# Patient Record
Sex: FEMALE | Race: WHITE | NOT HISPANIC OR LATINO | Employment: UNEMPLOYED | URBAN - METROPOLITAN AREA
[De-identification: names, ages, dates, MRNs, and addresses within clinical notes are randomized per-mention and may not be internally consistent; named-entity substitution may affect disease eponyms.]

---

## 2024-06-25 ENCOUNTER — OFFICE VISIT (OUTPATIENT)
Dept: PRIMARY CARE CLINIC | Facility: CLINIC | Age: 29
End: 2024-06-25
Payer: COMMERCIAL

## 2024-06-25 ENCOUNTER — LAB VISIT (OUTPATIENT)
Dept: LAB | Facility: HOSPITAL | Age: 29
End: 2024-06-25
Attending: FAMILY MEDICINE
Payer: COMMERCIAL

## 2024-06-25 VITALS
SYSTOLIC BLOOD PRESSURE: 122 MMHG | BODY MASS INDEX: 42.53 KG/M2 | DIASTOLIC BLOOD PRESSURE: 76 MMHG | OXYGEN SATURATION: 97 % | TEMPERATURE: 98 F | HEART RATE: 70 BPM | WEIGHT: 231.13 LBS | HEIGHT: 62 IN

## 2024-06-25 DIAGNOSIS — F39 MOOD DISORDER: ICD-10-CM

## 2024-06-25 DIAGNOSIS — R63.5 ABNORMAL WEIGHT GAIN: ICD-10-CM

## 2024-06-25 DIAGNOSIS — R42 POSITIONAL LIGHTHEADEDNESS: ICD-10-CM

## 2024-06-25 DIAGNOSIS — Z00.00 ROUTINE GENERAL MEDICAL EXAMINATION AT A HEALTH CARE FACILITY: ICD-10-CM

## 2024-06-25 DIAGNOSIS — R63.5 ABNORMAL WEIGHT GAIN: Primary | ICD-10-CM

## 2024-06-25 DIAGNOSIS — N94.6 DYSMENORRHEA: ICD-10-CM

## 2024-06-25 DIAGNOSIS — Z12.4 ENCOUNTER FOR SCREENING FOR CERVICAL CANCER: ICD-10-CM

## 2024-06-25 DIAGNOSIS — R41.840 DIFFICULTY CONCENTRATING: ICD-10-CM

## 2024-06-25 DIAGNOSIS — N92.6 ABNORMAL MENSES: ICD-10-CM

## 2024-06-25 LAB
INSULIN COLLECTION INTERVAL: NORMAL
INSULIN SERPL-ACNC: 20.3 UU/ML

## 2024-06-25 PROCEDURE — 3078F DIAST BP <80 MM HG: CPT | Mod: CPTII,S$GLB,, | Performed by: FAMILY MEDICINE

## 2024-06-25 PROCEDURE — 82728 ASSAY OF FERRITIN: CPT | Performed by: FAMILY MEDICINE

## 2024-06-25 PROCEDURE — 80061 LIPID PANEL: CPT | Performed by: FAMILY MEDICINE

## 2024-06-25 PROCEDURE — G2211 COMPLEX E/M VISIT ADD ON: HCPCS | Mod: S$GLB,,, | Performed by: FAMILY MEDICINE

## 2024-06-25 PROCEDURE — 84439 ASSAY OF FREE THYROXINE: CPT | Performed by: FAMILY MEDICINE

## 2024-06-25 PROCEDURE — 80053 COMPREHEN METABOLIC PANEL: CPT | Performed by: FAMILY MEDICINE

## 2024-06-25 PROCEDURE — 1160F RVW MEDS BY RX/DR IN RCRD: CPT | Mod: CPTII,S$GLB,, | Performed by: FAMILY MEDICINE

## 2024-06-25 PROCEDURE — 84443 ASSAY THYROID STIM HORMONE: CPT | Performed by: FAMILY MEDICINE

## 2024-06-25 PROCEDURE — 3008F BODY MASS INDEX DOCD: CPT | Mod: CPTII,S$GLB,, | Performed by: FAMILY MEDICINE

## 2024-06-25 PROCEDURE — 36415 COLL VENOUS BLD VENIPUNCTURE: CPT | Mod: PN | Performed by: FAMILY MEDICINE

## 2024-06-25 PROCEDURE — 83540 ASSAY OF IRON: CPT | Performed by: FAMILY MEDICINE

## 2024-06-25 PROCEDURE — 83036 HEMOGLOBIN GLYCOSYLATED A1C: CPT | Performed by: FAMILY MEDICINE

## 2024-06-25 PROCEDURE — 99205 OFFICE O/P NEW HI 60 MIN: CPT | Mod: S$GLB,,, | Performed by: FAMILY MEDICINE

## 2024-06-25 PROCEDURE — 3074F SYST BP LT 130 MM HG: CPT | Mod: CPTII,S$GLB,, | Performed by: FAMILY MEDICINE

## 2024-06-25 PROCEDURE — 85025 COMPLETE CBC W/AUTO DIFF WBC: CPT | Performed by: FAMILY MEDICINE

## 2024-06-25 PROCEDURE — 1159F MED LIST DOCD IN RCRD: CPT | Mod: CPTII,S$GLB,, | Performed by: FAMILY MEDICINE

## 2024-06-25 PROCEDURE — 83525 ASSAY OF INSULIN: CPT | Performed by: FAMILY MEDICINE

## 2024-06-25 PROCEDURE — 99999 PR PBB SHADOW E&M-NEW PATIENT-LVL V: CPT | Mod: PBBFAC,,, | Performed by: FAMILY MEDICINE

## 2024-06-25 RX ORDER — MULTIVITAMIN
1 TABLET ORAL DAILY
COMMUNITY

## 2024-06-25 RX ORDER — LANOLIN ALCOHOL/MO/W.PET/CERES
1 CREAM (GRAM) TOPICAL
COMMUNITY

## 2024-06-25 RX ORDER — MAGNESIUM GLUCONATE 27.5 (500)
TABLET ORAL ONCE
COMMUNITY

## 2024-06-25 NOTE — PROGRESS NOTES
Chief Complaint  Chief Complaint   Patient presents with    [facility]       HPI  Tamiko An is a 28 y.o. female with multiple medical diagnoses as listed in the medical history and problem list that presents for  in person visit.    The patient's last visit with me was on Visit date not found.     History of Present Illness    28-year-old female presents to [facility].    Appendectomy in 2016. History of anxiety, depression, and severe ADHD. Denies any other past medical conditions or surgical procedures.    Diabetes mellitus in mother, aunt, and father. Maternal grandfather had heart disease. Early onset arthritis in maternal grandmother, maternal grandfather, and paternal relatives. Father has history of alcohol and drug abuse. Mother has hypertension.    Reports heavy and painful menstrual cycles, having to change an ultra plus tampon every 45 minutes to an hour during the first 3 days. Associated nausea, dizziness, shortness of breath, and exhaustion to the point of being bedridden for a day. Irregular cycles ranging from monthly to 6 months without a period. Denies current contraception use as it previously worsened cycles and caused nausea.    Presenting with weight gain, inability to lose weight despite diet and exercise, heavy and painful menses, irregular cycles, unusual hair growth on face, and POTS-like symptoms. Family history of endometriosis. Previously on Depo-Provera at age 17 which led to weight gain. Concerned about fertility and wants evaluation for possible PCOS.    Reports difficulty losing weight despite being on a calorie deficit diet for 6 months and exercising 30 minutes twice daily. Has cut out refined sugars, limited sugar-free options, drinks mainly water, and limits carbs. Only lost around 5 lbs despite consistency. Rapid 90 pound weight gain over past 5 years. Denies recent bloodwork.    Takes a multivitamin, iron, magnesium, turmeric, and calcium daily as recommended by  previous doctor for obesity and metabolic issues. Started taking supplements on mother's recommendation. Denies prescription medications.    Reports lightheadedness and rapid heart rate upon standing, having to sit after climbing stairs. Mother and sister diagnosed with POTS. Unsure if symptoms are weight-related or due to potential anemia.    Reports dry and itchy ears with minimal wax production. On exam, provider notes minimal wax buildup.       [facility]    6/22/2024  8:21 AM CDT - Filed by Patient   This questionnaire should take approximately 5 to 10 minutes to complete.  To begin, press Let's Begin and then press Continue. Let's Begin   On average, how many days per week do you engage in moderate to strenuous exercise (like a brisk walk)? 5 days   On average, how many minutes do you engage in exercise at this level? 30 min   Do you feel stress - tense, restless, nervous, or anxious, or unable to sleep at night because your mind is troubled all the time - these days? To some extent   How often do you feel lonely or isolated from those around you? Sometimes   How often do you need to have someone help you when you read instructions, pamphlets, or other written material from your doctor or pharmacy? Rarely   How hard is it for you to pay for the very basics like food, housing, medical care, and heating? Not hard at all   In the past 12 months has the electric, gas, oil, or water company threatened to shut off services in your home? No   Within the past 12 months, you worried that your food would run out before you got the money to buy more. Never true   Within the past 12 months, the food you bought just didn't last and you didn't have money to get more. Never true   Has the lack of transportation kept you from medical appointments, meetings, work or from getting things needed for daily living? No   In the last 12 months, was there a time when you were not able to pay the mortgage or rent on time? No   In the  last 12 months, was there a time when you did not have a steady place to sleep or slept in a shelter (including now)? No   How often do you have a drink containing alcohol? Monthly or less   How many drinks containing alcohol do you have on a typical day when you are drinking? 3 or 4   How often do you have six or more drinks on one occasion? Less than monthly          Pmh, Psh, Family Hx, Social Hx updated in Epic Tabs today.     Review of Systems   Constitutional:  Positive for activity change, appetite change and unexpected weight change. Negative for chills, fatigue and fever.   HENT:  Negative for ear pain and trouble swallowing.    Eyes:  Negative for pain and visual disturbance.   Respiratory:  Negative for cough and shortness of breath.    Cardiovascular:  Negative for chest pain and leg swelling.   Gastrointestinal:  Negative for abdominal pain, blood in stool, nausea and vomiting.   Endocrine: Negative for cold intolerance and heat intolerance.   Genitourinary:  Positive for menstrual problem. Negative for dysuria, frequency and pelvic pain.   Musculoskeletal:  Positive for arthralgias and back pain. Negative for joint swelling, myalgias and neck pain.   Skin:  Negative for color change and rash.   Neurological:  Positive for dizziness and light-headedness. Negative for headaches.   Psychiatric/Behavioral:  Positive for decreased concentration. Negative for behavioral problems and sleep disturbance. The patient is nervous/anxious.        Physical Exam  Vitals reviewed.   Constitutional:       Appearance: Normal appearance. She is well-developed and well-groomed. She is morbidly obese.   HENT:      Head: Normocephalic and atraumatic.      Right Ear: Tympanic membrane and external ear normal.      Left Ear: Tympanic membrane and external ear normal.      Ears:      Comments: Flakes of skin on external ears reported      Nose: Nose normal.      Mouth/Throat:      Mouth: Mucous membranes are moist.       "Pharynx: Oropharynx is clear.   Eyes:      Conjunctiva/sclera: Conjunctivae normal.      Pupils: Pupils are equal, round, and reactive to light.   Neck:      Thyroid: No thyromegaly.   Cardiovascular:      Rate and Rhythm: Normal rate and regular rhythm.      Heart sounds: Normal heart sounds. No murmur heard.     No friction rub. No gallop.   Pulmonary:      Effort: Pulmonary effort is normal. No respiratory distress.      Breath sounds: Normal breath sounds. No wheezing or rales.   Abdominal:      General: Bowel sounds are normal. There is no distension.      Palpations: Abdomen is soft.      Tenderness: There is no abdominal tenderness. There is no rebound.   Musculoskeletal:         General: Normal range of motion.      Cervical back: Normal range of motion and neck supple.   Lymphadenopathy:      Cervical: No cervical adenopathy.   Skin:     General: Skin is warm and dry.      Findings: No rash.   Neurological:      Mental Status: She is alert and oriented to person, place, and time.   Psychiatric:         Attention and Perception: Attention and perception normal.         Mood and Affect: Mood and affect normal.         Speech: Speech normal.         Behavior: Behavior normal. Behavior is cooperative.         Thought Content: Thought content normal.         Cognition and Memory: Cognition and memory normal.         Judgment: Judgment normal.       Physical Exam    Vitals: Height: 5 feet 2 inches. Weight: 104 kilograms. BMI: 42.         ASSESSMENT/PLAN:    No results found for: "WBC", "HGB", "HCT", "PLT", "CHOL", "TRIG", "HDL", "LDLDIRECT", "ALT", "AST", "NA", "K", "CL", "CREATININE", "BUN", "CO2", "TSH", "PSA", "INR", "GLUF", "HGBA1C", "MICROALBUR"    1. Abnormal weight gain  -     TSH; Future; Expected date: 06/25/2024  -     T4, Free; Future; Expected date: 06/25/2024  -     Lipid Panel; Future; Expected date: 06/25/2024  -     Hemoglobin A1C; Future; Expected date: 06/25/2024  -     Comprehensive Metabolic " Panel; Future; Expected date: 06/25/2024  -     CBC Auto Differential; Future; Expected date: 06/25/2024  -     Insulin, Random; Future; Expected date: 06/25/2024  -     Ambulatory referral/consult to Endocrinology; Future; Expected date: 07/02/2024  -     Iron and TIBC; Future; Expected date: 06/25/2024  -     Ferritin; Future; Expected date: 06/25/2024    2. Dysmenorrhea  -     TSH; Future; Expected date: 06/25/2024  -     T4, Free; Future; Expected date: 06/25/2024  -     Lipid Panel; Future; Expected date: 06/25/2024  -     Hemoglobin A1C; Future; Expected date: 06/25/2024  -     Comprehensive Metabolic Panel; Future; Expected date: 06/25/2024  -     CBC Auto Differential; Future; Expected date: 06/25/2024  -     Insulin, Random; Future; Expected date: 06/25/2024  -     Ambulatory referral/consult to Obstetrics / Gynecology; Future; Expected date: 07/02/2024  -     Ambulatory referral/consult to Endocrinology; Future; Expected date: 07/02/2024  -     Iron and TIBC; Future; Expected date: 06/25/2024  -     Ferritin; Future; Expected date: 06/25/2024    3. Abnormal menses    4. Mood disorder    5. Positional lightheadedness    6. Difficulty concentrating    7. Routine general medical examination at a health care facility  -     TSH; Future; Expected date: 06/25/2024  -     T4, Free; Future; Expected date: 06/25/2024  -     Lipid Panel; Future; Expected date: 06/25/2024  -     Hemoglobin A1C; Future; Expected date: 06/25/2024  -     Comprehensive Metabolic Panel; Future; Expected date: 06/25/2024  -     CBC Auto Differential; Future; Expected date: 06/25/2024  -     Insulin, Random; Future; Expected date: 06/25/2024  -     Iron and TIBC; Future; Expected date: 06/25/2024  -     Ferritin; Future; Expected date: 06/25/2024    8. Encounter for screening for cervical cancer  -     Ambulatory referral/consult to Obstetrics / Gynecology; Future; Expected date: 07/02/2024    9. BMI 40.0-44.9, adult        Assessment &  Plan    METABOLIC OR HORMONAL ISSUE:  - Suspect underlying metabolic or hormonal issue given weight gain despite lifestyle changes, family history of diabetes, and menstrual irregularities.  - Ordered labs to assess for diabetes, insulin resistance, and thyroid dysfunction.  - Discussed potential causes of weight gain including insulin resistance, PCOS, thyroid dysfunction.  - Ordered CBC, CMP, HbA1c, fasting insulin, TSH, free T4.  - Referred to Dr. Yesenia Jordan at [facility] for evaluation of weight gain and insulin resistance.  PCOS OR ENDOMETRIOSIS:  - Heavy, painful menses concerning for possible PCOS or endometriosis, especially with weight gain and hirsutism.  - Referred to GYN for evaluation.  - Referred to Dr. Robert Beltre at [facility] for evaluation of heavy, painful menses and possible PCOS/endometriosis.  ANEMIA OR POTS:  - Symptoms of lightheadedness and tachycardia with exertion could be anemia from heavy menses.  - Ordered CBC and iron studies.  - If normal, may refer to cardiology to evaluate for POTS given family history.  DRY, FLAKY EARS:  - Explained dry, flaky ears can often be managed with OTC hydrocortisone cream.  - Prescription steroid cream available if needed.  FOLLOW UP AND MENTAL HEALTH:  - Follow up in 3 months to review labs, GYN evaluation, and progress with [facility].  - Patient to message through online portal or call office if she obtains prior mental health records in the interim, as this will allow restarting psychiatric medications sooner.       No image results found.      Follow-up: Follow up in about 3 months (around 9/25/2024) for f/u OV Dr. Liz/ labs, mental health .    I spent a total of   60    minutes face to face and non-face to face on the date of this visit.This includes time preparing to see the patient (eg, review of tests, notes), obtaining and/or reviewing additional history from an independent historian and/or  outside medical records, documenting clinical information in the electronic health record, independently interpreting results and/or communicating results to the patient/family/caregiver, or care coordinator.  Visit today included increased complexity associated with the care of the episodic problem addressed and managing the longitudinal care of the patient due to the serious and/or complex managed problem(s).    This note was generated with the assistance of ambient listening technology. Verbal consent was obtained by the patient and accompanying visitor(s) for the recording of patient appointment to facilitate this note. I attest to having reviewed and edited the generated note for accuracy, though some syntax or spelling errors may persist. Please contact the author of this note for any clarification.       There are no Patient Instructions on file for this visit.

## 2024-06-26 LAB
ALBUMIN SERPL BCP-MCNC: 3.6 G/DL (ref 3.5–5.2)
ALP SERPL-CCNC: 91 U/L (ref 55–135)
ALT SERPL W/O P-5'-P-CCNC: 14 U/L (ref 10–44)
ANION GAP SERPL CALC-SCNC: 11 MMOL/L (ref 8–16)
AST SERPL-CCNC: 13 U/L (ref 10–40)
BASOPHILS # BLD AUTO: 0.04 K/UL (ref 0–0.2)
BASOPHILS NFR BLD: 0.5 % (ref 0–1.9)
BILIRUB SERPL-MCNC: 0.2 MG/DL (ref 0.1–1)
BUN SERPL-MCNC: 18 MG/DL (ref 6–20)
CALCIUM SERPL-MCNC: 9.7 MG/DL (ref 8.7–10.5)
CHLORIDE SERPL-SCNC: 108 MMOL/L (ref 95–110)
CHOLEST SERPL-MCNC: 199 MG/DL (ref 120–199)
CHOLEST/HDLC SERPL: 4.4 {RATIO} (ref 2–5)
CO2 SERPL-SCNC: 22 MMOL/L (ref 23–29)
CREAT SERPL-MCNC: 0.8 MG/DL (ref 0.5–1.4)
DIFFERENTIAL METHOD BLD: ABNORMAL
EOSINOPHIL # BLD AUTO: 0.1 K/UL (ref 0–0.5)
EOSINOPHIL NFR BLD: 1.2 % (ref 0–8)
ERYTHROCYTE [DISTWIDTH] IN BLOOD BY AUTOMATED COUNT: 14.5 % (ref 11.5–14.5)
EST. GFR  (NO RACE VARIABLE): >60 ML/MIN/1.73 M^2
ESTIMATED AVG GLUCOSE: 108 MG/DL (ref 68–131)
FERRITIN SERPL-MCNC: 19 NG/ML (ref 20–300)
GLUCOSE SERPL-MCNC: 88 MG/DL (ref 70–110)
HBA1C MFR BLD: 5.4 % (ref 4–5.6)
HCT VFR BLD AUTO: 37.5 % (ref 37–48.5)
HDLC SERPL-MCNC: 45 MG/DL (ref 40–75)
HDLC SERPL: 22.6 % (ref 20–50)
HGB BLD-MCNC: 11.2 G/DL (ref 12–16)
IMM GRANULOCYTES # BLD AUTO: 0.02 K/UL (ref 0–0.04)
IMM GRANULOCYTES NFR BLD AUTO: 0.2 % (ref 0–0.5)
IRON SERPL-MCNC: 27 UG/DL (ref 30–160)
LDLC SERPL CALC-MCNC: 143.6 MG/DL (ref 63–159)
LYMPHOCYTES # BLD AUTO: 3 K/UL (ref 1–4.8)
LYMPHOCYTES NFR BLD: 35.4 % (ref 18–48)
MCH RBC QN AUTO: 25.5 PG (ref 27–31)
MCHC RBC AUTO-ENTMCNC: 29.9 G/DL (ref 32–36)
MCV RBC AUTO: 85 FL (ref 82–98)
MONOCYTES # BLD AUTO: 0.5 K/UL (ref 0.3–1)
MONOCYTES NFR BLD: 5.4 % (ref 4–15)
NEUTROPHILS # BLD AUTO: 4.8 K/UL (ref 1.8–7.7)
NEUTROPHILS NFR BLD: 57.3 % (ref 38–73)
NONHDLC SERPL-MCNC: 154 MG/DL
NRBC BLD-RTO: 0 /100 WBC
PLATELET # BLD AUTO: 423 K/UL (ref 150–450)
PMV BLD AUTO: 9.5 FL (ref 9.2–12.9)
POTASSIUM SERPL-SCNC: 4.8 MMOL/L (ref 3.5–5.1)
PROT SERPL-MCNC: 7.4 G/DL (ref 6–8.4)
RBC # BLD AUTO: 4.4 M/UL (ref 4–5.4)
SATURATED IRON: 6 % (ref 20–50)
SODIUM SERPL-SCNC: 141 MMOL/L (ref 136–145)
T4 FREE SERPL-MCNC: 0.88 NG/DL (ref 0.71–1.51)
TOTAL IRON BINDING CAPACITY: 440 UG/DL (ref 250–450)
TRANSFERRIN SERPL-MCNC: 297 MG/DL (ref 200–375)
TRIGL SERPL-MCNC: 52 MG/DL (ref 30–150)
TSH SERPL DL<=0.005 MIU/L-ACNC: 0.87 UIU/ML (ref 0.4–4)
WBC # BLD AUTO: 8.34 K/UL (ref 3.9–12.7)

## 2024-10-21 ENCOUNTER — PATIENT MESSAGE (OUTPATIENT)
Dept: PRIMARY CARE CLINIC | Facility: CLINIC | Age: 29
End: 2024-10-21
Payer: COMMERCIAL

## 2024-10-23 ENCOUNTER — OFFICE VISIT (OUTPATIENT)
Dept: PRIMARY CARE CLINIC | Facility: CLINIC | Age: 29
End: 2024-10-23
Payer: COMMERCIAL

## 2024-10-23 ENCOUNTER — PATIENT MESSAGE (OUTPATIENT)
Dept: PRIMARY CARE CLINIC | Facility: CLINIC | Age: 29
End: 2024-10-23

## 2024-10-23 VITALS
OXYGEN SATURATION: 97 % | DIASTOLIC BLOOD PRESSURE: 76 MMHG | WEIGHT: 234.69 LBS | SYSTOLIC BLOOD PRESSURE: 114 MMHG | BODY MASS INDEX: 42.92 KG/M2 | TEMPERATURE: 98 F | HEART RATE: 92 BPM

## 2024-10-23 DIAGNOSIS — E66.01 CLASS 3 SEVERE OBESITY WITH BODY MASS INDEX (BMI) OF 40.0 TO 44.9 IN ADULT, UNSPECIFIED OBESITY TYPE, UNSPECIFIED WHETHER SERIOUS COMORBIDITY PRESENT: ICD-10-CM

## 2024-10-23 DIAGNOSIS — F51.01 PRIMARY INSOMNIA: ICD-10-CM

## 2024-10-23 DIAGNOSIS — E66.813 CLASS 3 SEVERE OBESITY WITH BODY MASS INDEX (BMI) OF 40.0 TO 44.9 IN ADULT, UNSPECIFIED OBESITY TYPE, UNSPECIFIED WHETHER SERIOUS COMORBIDITY PRESENT: ICD-10-CM

## 2024-10-23 DIAGNOSIS — N92.6 IRREGULAR MENSTRUAL CYCLE: ICD-10-CM

## 2024-10-23 DIAGNOSIS — D50.8 IRON DEFICIENCY ANEMIA SECONDARY TO INADEQUATE DIETARY IRON INTAKE: ICD-10-CM

## 2024-10-23 DIAGNOSIS — R63.5 ABNORMAL WEIGHT GAIN: Primary | ICD-10-CM

## 2024-10-23 PROCEDURE — 3008F BODY MASS INDEX DOCD: CPT | Mod: CPTII,S$GLB,, | Performed by: NURSE PRACTITIONER

## 2024-10-23 PROCEDURE — 99214 OFFICE O/P EST MOD 30 MIN: CPT | Mod: S$GLB,,, | Performed by: NURSE PRACTITIONER

## 2024-10-23 PROCEDURE — 1159F MED LIST DOCD IN RCRD: CPT | Mod: CPTII,S$GLB,, | Performed by: NURSE PRACTITIONER

## 2024-10-23 PROCEDURE — 3074F SYST BP LT 130 MM HG: CPT | Mod: CPTII,S$GLB,, | Performed by: NURSE PRACTITIONER

## 2024-10-23 PROCEDURE — 1160F RVW MEDS BY RX/DR IN RCRD: CPT | Mod: CPTII,S$GLB,, | Performed by: NURSE PRACTITIONER

## 2024-10-23 PROCEDURE — 99999 PR PBB SHADOW E&M-EST. PATIENT-LVL III: CPT | Mod: PBBFAC,,, | Performed by: NURSE PRACTITIONER

## 2024-10-23 PROCEDURE — 3044F HG A1C LEVEL LT 7.0%: CPT | Mod: CPTII,S$GLB,, | Performed by: NURSE PRACTITIONER

## 2024-10-23 PROCEDURE — 3078F DIAST BP <80 MM HG: CPT | Mod: CPTII,S$GLB,, | Performed by: NURSE PRACTITIONER

## 2024-10-23 RX ORDER — TIRZEPATIDE 2.5 MG/.5ML
2.5 INJECTION, SOLUTION SUBCUTANEOUS
Qty: 4 ML | Refills: 0 | Status: SHIPPED | OUTPATIENT
Start: 2024-10-23 | End: 2024-10-24

## 2024-10-23 RX ORDER — TRAZODONE HYDROCHLORIDE 50 MG/1
50 TABLET ORAL NIGHTLY
Qty: 30 TABLET | Refills: 11 | Status: SHIPPED | OUTPATIENT
Start: 2024-10-23 | End: 2025-10-23

## 2024-10-23 NOTE — PROGRESS NOTES
Chief Complaint  Chief Complaint   Patient presents with    Weight Loss     Reports insomnia x 2 years and weight loss questions        History of Present Illness    Ms. An presents today for weight loss management.    She reports ongoing weight loss efforts since January, including maintaining a calorie deficit, exercising twice daily, and avoiding processed carbohydrates after noon. Despite these measures, she has experienced limited results, either gaining weight or losing only 10 lbs. She denies using any weight loss medications such as Adipex. She expresses interest in trying semaglutide-type medications, citing her sister's success with a similar medication after experiencing a weight loss plateau. She works a desk job.    She reports irregular menstrual cycles, with her last cycle occurring in July. She experiences heavy menstrual flow for the first two days of her cycle, after which the flow becomes more manageable. She denies being on birth control. She has no children and is not actively trying to get pregnant. She is not currently using any contraceptive methods. She and her partner are open to pregnancy but are not actively planning for it at this time. A previous gynecologist suggested she may have polycystic ovary syndrome (PCOS), but hormone panel testing was not performed.    She has a history of iron deficiency anemia, for which she is currently taking vitamins. She denies having high blood pressure.    She reports difficulty staying asleep, waking up two hours after falling asleep and being unable to fall back asleep. She denies issues with falling asleep initially. She has tried melatonin for sleep but reports it to be ineffective.      ROS:  General: -fever, -chills, -fatigue, +weight gain, -weight loss  Eyes: -vision changes, -redness, -discharge  ENT: -ear pain, -nasal congestion, -sore throat  Cardiovascular: -chest pain, -palpitations, -lower extremity edema  Respiratory: -cough,  -shortness of breath  Gastrointestinal: -abdominal pain, -nausea, -vomiting, -diarrhea, -constipation, -blood in stool  Genitourinary: -dysuria, -hematuria, -frequency  Musculoskeletal: -joint pain, -muscle pain  Skin: -rash, -lesion  Neurological: -headache, -dizziness, -numbness, -tingling  Psychiatric: -anxiety, -depression, +sleep difficulty            Wt Readings from Last 10 Encounters:   10/23/24 106.4 kg (234 lb 10.9 oz)   06/25/24 104.9 kg (231 lb 2.4 oz)         PAST MEDICAL HISTORY:  Past Medical History:   Diagnosis Date    ADHD     Back pain     Dr. Luo-chiropractor    Depression     Generalized anxiety disorder        PAST SURGICAL HISTORY:  Past Surgical History:   Procedure Laterality Date    APPENDECTOMY  2016       SOCIAL HISTORY:  Social History     Socioeconomic History    Marital status: Single   Tobacco Use    Smoking status: Never     Passive exposure: Never    Smokeless tobacco: Never     Social Drivers of Health     Financial Resource Strain: Low Risk  (6/22/2024)    Overall Financial Resource Strain (CARDIA)     Difficulty of Paying Living Expenses: Not hard at all   Food Insecurity: No Food Insecurity (6/22/2024)    Hunger Vital Sign     Worried About Running Out of Food in the Last Year: Never true     Ran Out of Food in the Last Year: Never true   Physical Activity: Sufficiently Active (6/22/2024)    Exercise Vital Sign     Days of Exercise per Week: 5 days     Minutes of Exercise per Session: 30 min   Stress: Stress Concern Present (6/22/2024)    [employer] - Occupational Stress Questionnaire     Feeling of Stress : To some extent   Housing Stability: Unknown (6/22/2024)    Housing Stability Vital Sign     Unable to Pay for Housing in the Last Year: No       FAMILY HISTORY:  Family History   Problem Relation Name Age of Onset    Diabetes Mother      Arthritis Mother      Hypotension Mother      Diabetes Father      Arthritis Maternal Grandmother       Coronary artery disease Maternal Grandfather      Arthritis Maternal Grandfather         ALLERGIES AND MEDICATIONS: updated and reviewed.  Review of patient's allergies indicates:   Allergen Reactions    Vancomycin analogues Anaphylaxis    Latex, natural rubber Hives     Current Outpatient Medications   Medication Sig Dispense Refill    calcium carbonate 1250 MG capsule Take 1,250 mg by mouth 2 (two) times daily with meals.      ferrous sulfate (FEOSOL) Tab tablet Take 1 tablet by mouth daily with breakfast.      magnesium gluconate 27.5 mg magne- sium (500 mg) Tab Take by mouth once.      multivitamin (THERAGRAN) per tablet Take 1 tablet by mouth once daily.      tirzepatide, weight loss, (ZEPBOUND) 2.5 mg/0.5 mL PnIj Inject 2.5 mg into the skin every 7 days. 4 mL 0    traZODone (DESYREL) 50 MG tablet Take 1 tablet (50 mg total) by mouth every evening. 30 tablet 11     No current facility-administered medications for this visit.           Physical Exam      General: No acute distress. Well-developed. Well-nourished.  Head: Normocephalic. Atraumatic.  Eyes: EOMI. PERRLA. Conjunctivae non-injected. Sclerae anicteric.  Ears: Bilateral EACs clear. Bilateral TMs clear and intact.  Nose: Nares patent. Normal turbinates. No nasal discharge.  Mouth: Moist oral mucosa. Normal dentition.  Throat: No erythema. No exudate. Uvula midline.  Neck: Supple. Full ROM. Non-tender. No JVD. No carotid bruits. No thyromegaly. No lymphadenopathy.  Cardiovascular: Regular rate. Regular rhythm. No murmurs. No rubs. No gallops. Normal S1, S2. Peripheral pulses 2+ and symmetric.  Respiratory: Normal respiratory effort. Clear to auscultation bilaterally. No rales. No rhonchi. No wheezing.  Abdomen: Soft. Non-tender. Non-distended. Normal bowel sounds. Negative McBurney's. Negative Branch's. No rebound. No guarding. No hepatosplenomegaly. No masses.  Musculoskeletal: No  obvious deformity. Normal muscle development. Normal muscle tone. FROM at  all joints. No swelling. No tenderness.  Back: No CVA tenderness. No spinal deformity.  Extremities: No cyanosis. No clubbing. No upper extremity edema. No lower extremity edema.  Neurological: Alert & oriented x3. CN II-XII intact. Reflexes 2+ throughout. Strength 5/5 in all extremities. Sensation intact. No slurred speech. Normal gait.  Psychiatric: Normal mood. Normal affect. Good insight. Good judgment. No evidence of suicidal ideation. No evidence of homicidal ideation.  Skin: Warm. Dry. Intact. No rash. No ulcers. No suspicious lesions.        Vitals:    10/23/24 1515   BP: 114/76   BP Location: Right forearm   Patient Position: Sitting   Pulse: 92   Temp: 98.3 °F (36.8 °C)   TempSrc: Tympanic   SpO2: 97%   Weight: 106.4 kg (234 lb 10.9 oz)    Body mass index is 42.92 kg/m².  Weight: 106.4 kg (234 lb 10.9 oz)           Health Maintenance         Date Due Completion Date    Hepatitis C Screening Never done ---    HIV Screening Never done ---    TETANUS VACCINE Never done ---    Pap Smear Never done ---    Influenza Vaccine (1) Never done ---    COVID-19 Vaccine (1 - 2024-25 season) Never done ---    RSV Vaccine (Age 60+ and Pregnant patients) (1 - 1-dose 75+ series) 07/19/2071 ---            Assessment & Plan      IMPRESSION:  -Considered weight loss management options for patient with history of unsuccessful diet and exercise attempts  -Opted for GLP-1 receptor agonist (Zepbound/tirzepatide) over semaglutide due to potential for plateauing weight loss on semaglutide.  -Assessed patient's insurance coverage and discussed potential cost barriers  -Evaluated patient's recent lab results, noting iron deficiency anemia consistent with women of childbearing age  -Addressed patient's irregular menstrual cycles, considering potential PCOS, though formal diagnosis not pursued  -Determined need for sleep aid due to patient's reported difficulty staying asleep    WEIGHT MANAGEMENT:  - Explained potential side effects of  Zepbound, including delayed gastric emptying and constipation.  - Discussed importance of high fiber and high protein diet while on Zepbound.  - Advised on potential muscle mass breakdown with GLP-1 agonists and importance of exercise.  - Ms. Nataliya to increase fiber intake through green leafy vegetables, flaxseed, trail mixes, raisins, grapes, and fruits.  - Ms. Nataliya to incorporate high protein diet, including seafood and protein shakes (e.g., Muscle Milk).  - Recommend engaging in brisk walks and exercise to improve medication metabolism.  - Ms. Nataliya to monitor for mild abdominal discomfort, which is common but should taper off.  - Started Zepbound (tirzepatide) 2.5 mg weekly for 4 weeks, then increase to 5 mg weekly if tolerated.  - Follow up in 4 weeks for weight loss management.    INSOMNIA:  - Started Trazodone 50 mg nightly for sleep, with instructions to increase to 100 mg (two 50 mg tablets) after 72 hours if ineffective, and further to 150 mg after another 72 hours if needed.    LABS:  - CMP and CBC ordered to be done 2 days before next appointment with ESTHER Kelley.   - Complete lab work (CMP, CBC) 2 days before next appointment.    OTHER INSTRUCTIONS:  - Informed patient about contraindication of Zepbound in pregnancy.  - Educated on signs to stop medication: headaches, vision changes, chest pain, shortness of breath, moderate to severe abdominal pain.  - Urine pregnancy test performed in office.   - Patient educated on Zepbound decreasing the efficacy of oral contraceptive therapy and to use extra coverage during intercourse if she and the spouse are not family planning at this time.            Problem List Items Addressed This Visit          Endocrine    Abnormal weight gain - Primary    Relevant Medications    tirzepatide, weight loss, (ZEPBOUND) 2.5 mg/0.5 mL PnIj    Other Relevant Orders    Pregnancy, urine rapid     Other Visit Diagnoses       Class 3 severe obesity with body mass index (BMI) of  40.0 to 44.9 in adult, unspecified obesity type, unspecified whether serious comorbidity present        Relevant Medications    tirzepatide, weight loss, (ZEPBOUND) 2.5 mg/0.5 mL PnIj    Other Relevant Orders    Pregnancy, urine rapid    Iron deficiency anemia secondary to inadequate dietary iron intake        Irregular menstrual cycle        Relevant Medications    tirzepatide, weight loss, (ZEPBOUND) 2.5 mg/0.5 mL PnIj    Primary insomnia        Relevant Medications    traZODone (DESYREL) 50 MG tablet              Healthcare Maintenance: Deferred at this time.     27 minutes of total time spent on the encounter, which includes face to face time and non-face to face time preparing to see the patient (eg, review of tests), Obtaining and/or reviewing separately obtained history, documenting clinical information in the electronic or other health record, independently interpreting results (not separately reported) and communicating results to the patient/family/caregiver, or Care coordination (not separately reported). Visit today included increased complexity associated with the care of the episodic problem addressed and managing the longitudinal care of the patient due to the serious and/or complex managed problem(s).        Sincerely,  Warren Schulte NP       Answers submitted by the patient for this visit:  Review of Systems Questionnaire (Submitted on 10/23/2024)  activity change: No  unexpected weight change: Yes  neck pain: No  hearing loss: No  rhinorrhea: No  trouble swallowing: No  eye discharge: No  visual disturbance: No  chest tightness: No  wheezing: No  chest pain: No  palpitations: No  blood in stool: No  constipation: No  vomiting: No  diarrhea: No  polydipsia: No  polyuria: No  difficulty urinating: No  hematuria: No  menstrual problem: No  dysuria: No  joint swelling: No  arthralgias: No  headaches: Yes  weakness: No  confusion: No  dysphoric mood: Yes

## 2024-10-24 DIAGNOSIS — E66.01 CLASS 3 SEVERE OBESITY WITH BODY MASS INDEX (BMI) OF 40.0 TO 44.9 IN ADULT, UNSPECIFIED OBESITY TYPE, UNSPECIFIED WHETHER SERIOUS COMORBIDITY PRESENT: ICD-10-CM

## 2024-10-24 DIAGNOSIS — E66.813 CLASS 3 SEVERE OBESITY WITH BODY MASS INDEX (BMI) OF 40.0 TO 44.9 IN ADULT, UNSPECIFIED OBESITY TYPE, UNSPECIFIED WHETHER SERIOUS COMORBIDITY PRESENT: ICD-10-CM

## 2024-10-24 DIAGNOSIS — R63.5 ABNORMAL WEIGHT GAIN: Primary | ICD-10-CM

## 2024-10-24 PROBLEM — D50.8 IRON DEFICIENCY ANEMIA SECONDARY TO INADEQUATE DIETARY IRON INTAKE: Status: ACTIVE | Noted: 2024-10-24

## 2024-10-24 PROBLEM — F51.01 PRIMARY INSOMNIA: Status: ACTIVE | Noted: 2024-10-24

## 2024-10-24 PROBLEM — N92.6 IRREGULAR MENSTRUAL CYCLE: Status: ACTIVE | Noted: 2024-10-24

## 2024-10-24 RX ORDER — TIRZEPATIDE 2.5 MG/.5ML
2.5 INJECTION, SOLUTION SUBCUTANEOUS
Qty: 1 EACH | Refills: 0 | Status: SHIPPED | OUTPATIENT
Start: 2024-10-24

## 2024-10-25 ENCOUNTER — PATIENT MESSAGE (OUTPATIENT)
Dept: PRIMARY CARE CLINIC | Facility: CLINIC | Age: 29
End: 2024-10-25
Payer: COMMERCIAL

## 2024-10-31 ENCOUNTER — OFFICE VISIT (OUTPATIENT)
Dept: PRIMARY CARE CLINIC | Facility: CLINIC | Age: 29
End: 2024-10-31
Payer: COMMERCIAL

## 2024-10-31 ENCOUNTER — LAB VISIT (OUTPATIENT)
Dept: LAB | Facility: HOSPITAL | Age: 29
End: 2024-10-31
Attending: NURSE PRACTITIONER
Payer: COMMERCIAL

## 2024-10-31 VITALS
OXYGEN SATURATION: 98 % | BODY MASS INDEX: 42.92 KG/M2 | WEIGHT: 234.69 LBS | HEART RATE: 112 BPM | DIASTOLIC BLOOD PRESSURE: 84 MMHG | TEMPERATURE: 99 F | SYSTOLIC BLOOD PRESSURE: 132 MMHG

## 2024-10-31 DIAGNOSIS — F90.2 ADHD (ATTENTION DEFICIT HYPERACTIVITY DISORDER), COMBINED TYPE: ICD-10-CM

## 2024-10-31 DIAGNOSIS — F34.1 DYSTHYMIA: ICD-10-CM

## 2024-10-31 DIAGNOSIS — F39 MOOD DISORDER: ICD-10-CM

## 2024-10-31 DIAGNOSIS — F41.8 OTHER SPECIFIED ANXIETY DISORDERS: ICD-10-CM

## 2024-10-31 DIAGNOSIS — E88.819 INSULIN RESISTANCE: ICD-10-CM

## 2024-10-31 DIAGNOSIS — F90.2 ADHD (ATTENTION DEFICIT HYPERACTIVITY DISORDER), COMBINED TYPE: Primary | ICD-10-CM

## 2024-10-31 DIAGNOSIS — F33.42 RECURRENT MAJOR DEPRESSIVE DISORDER, IN FULL REMISSION: ICD-10-CM

## 2024-10-31 LAB
ALBUMIN SERPL BCP-MCNC: 3.4 G/DL (ref 3.5–5.2)
ALP SERPL-CCNC: 99 U/L (ref 40–150)
ALT SERPL W/O P-5'-P-CCNC: 15 U/L (ref 10–44)
ANION GAP SERPL CALC-SCNC: 8 MMOL/L (ref 8–16)
AST SERPL-CCNC: 13 U/L (ref 10–40)
BASOPHILS # BLD AUTO: 0.05 K/UL (ref 0–0.2)
BASOPHILS NFR BLD: 0.6 % (ref 0–1.9)
BILIRUB SERPL-MCNC: 0.1 MG/DL (ref 0.1–1)
BUN SERPL-MCNC: 10 MG/DL (ref 6–20)
CALCIUM SERPL-MCNC: 9.1 MG/DL (ref 8.7–10.5)
CHLORIDE SERPL-SCNC: 107 MMOL/L (ref 95–110)
CO2 SERPL-SCNC: 24 MMOL/L (ref 23–29)
CREAT SERPL-MCNC: 0.9 MG/DL (ref 0.5–1.4)
DIFFERENTIAL METHOD BLD: ABNORMAL
EOSINOPHIL # BLD AUTO: 0.2 K/UL (ref 0–0.5)
EOSINOPHIL NFR BLD: 1.7 % (ref 0–8)
ERYTHROCYTE [DISTWIDTH] IN BLOOD BY AUTOMATED COUNT: 13.6 % (ref 11.5–14.5)
EST. GFR  (NO RACE VARIABLE): >60 ML/MIN/1.73 M^2
GLUCOSE SERPL-MCNC: 106 MG/DL (ref 70–110)
HCT VFR BLD AUTO: 36.5 % (ref 37–48.5)
HGB BLD-MCNC: 11.4 G/DL (ref 12–16)
IMM GRANULOCYTES # BLD AUTO: 0.02 K/UL (ref 0–0.04)
IMM GRANULOCYTES NFR BLD AUTO: 0.2 % (ref 0–0.5)
LYMPHOCYTES # BLD AUTO: 2.5 K/UL (ref 1–4.8)
LYMPHOCYTES NFR BLD: 27.2 % (ref 18–48)
MCH RBC QN AUTO: 26.3 PG (ref 27–31)
MCHC RBC AUTO-ENTMCNC: 31.2 G/DL (ref 32–36)
MCV RBC AUTO: 84 FL (ref 82–98)
MONOCYTES # BLD AUTO: 0.5 K/UL (ref 0.3–1)
MONOCYTES NFR BLD: 5.9 % (ref 4–15)
NEUTROPHILS # BLD AUTO: 5.8 K/UL (ref 1.8–7.7)
NEUTROPHILS NFR BLD: 64.4 % (ref 38–73)
NRBC BLD-RTO: 0 /100 WBC
PLATELET # BLD AUTO: 368 K/UL (ref 150–450)
PMV BLD AUTO: 9.5 FL (ref 9.2–12.9)
POTASSIUM SERPL-SCNC: 4 MMOL/L (ref 3.5–5.1)
PROT SERPL-MCNC: 7.2 G/DL (ref 6–8.4)
RBC # BLD AUTO: 4.34 M/UL (ref 4–5.4)
SODIUM SERPL-SCNC: 139 MMOL/L (ref 136–145)
WBC # BLD AUTO: 9.02 K/UL (ref 3.9–12.7)

## 2024-10-31 PROCEDURE — 99214 OFFICE O/P EST MOD 30 MIN: CPT | Mod: S$GLB,,, | Performed by: NURSE PRACTITIONER

## 2024-10-31 PROCEDURE — 80053 COMPREHEN METABOLIC PANEL: CPT | Performed by: NURSE PRACTITIONER

## 2024-10-31 PROCEDURE — 1159F MED LIST DOCD IN RCRD: CPT | Mod: CPTII,S$GLB,, | Performed by: NURSE PRACTITIONER

## 2024-10-31 PROCEDURE — G2211 COMPLEX E/M VISIT ADD ON: HCPCS | Mod: S$GLB,,, | Performed by: NURSE PRACTITIONER

## 2024-10-31 PROCEDURE — 1160F RVW MEDS BY RX/DR IN RCRD: CPT | Mod: CPTII,S$GLB,, | Performed by: NURSE PRACTITIONER

## 2024-10-31 PROCEDURE — 85025 COMPLETE CBC W/AUTO DIFF WBC: CPT | Performed by: NURSE PRACTITIONER

## 2024-10-31 PROCEDURE — 99999 PR PBB SHADOW E&M-EST. PATIENT-LVL III: CPT | Mod: PBBFAC,,, | Performed by: NURSE PRACTITIONER

## 2024-10-31 PROCEDURE — 3008F BODY MASS INDEX DOCD: CPT | Mod: CPTII,S$GLB,, | Performed by: NURSE PRACTITIONER

## 2024-10-31 PROCEDURE — 3079F DIAST BP 80-89 MM HG: CPT | Mod: CPTII,S$GLB,, | Performed by: NURSE PRACTITIONER

## 2024-10-31 PROCEDURE — 36415 COLL VENOUS BLD VENIPUNCTURE: CPT | Mod: PN | Performed by: NURSE PRACTITIONER

## 2024-10-31 PROCEDURE — 3044F HG A1C LEVEL LT 7.0%: CPT | Mod: CPTII,S$GLB,, | Performed by: NURSE PRACTITIONER

## 2024-10-31 PROCEDURE — 3075F SYST BP GE 130 - 139MM HG: CPT | Mod: CPTII,S$GLB,, | Performed by: NURSE PRACTITIONER

## 2024-10-31 RX ORDER — METHYLPHENIDATE HYDROCHLORIDE 18 MG/1
TABLET ORAL
Qty: 30 TABLET | Refills: 0 | Status: SHIPPED | OUTPATIENT
Start: 2024-10-31

## 2024-10-31 RX ORDER — BUPROPION HYDROCHLORIDE 100 MG/1
TABLET, EXTENDED RELEASE ORAL
Qty: 60 TABLET | Refills: 11 | Status: SHIPPED | OUTPATIENT
Start: 2024-10-31

## 2024-11-20 ENCOUNTER — PATIENT OUTREACH (OUTPATIENT)
Dept: ADMINISTRATIVE | Facility: HOSPITAL | Age: 29
End: 2024-11-20
Payer: COMMERCIAL

## 2024-11-20 NOTE — PROGRESS NOTES
VB Score: 1     Cervical Cancer Screening                 Patient has been dismissed from [facility] due to multiple no shows and cancellations of appointments. Patient has PCP appt on 11.21.24. Made note to have HM Topic Addressed at appointment.

## 2024-11-21 ENCOUNTER — OFFICE VISIT (OUTPATIENT)
Dept: PRIMARY CARE CLINIC | Facility: CLINIC | Age: 29
End: 2024-11-21
Payer: COMMERCIAL

## 2024-11-21 VITALS
BODY MASS INDEX: 41.55 KG/M2 | WEIGHT: 227.19 LBS | DIASTOLIC BLOOD PRESSURE: 82 MMHG | SYSTOLIC BLOOD PRESSURE: 116 MMHG | RESPIRATION RATE: 18 BRPM | TEMPERATURE: 99 F | OXYGEN SATURATION: 97 % | HEART RATE: 96 BPM

## 2024-11-21 DIAGNOSIS — F39 MOOD DISORDER: ICD-10-CM

## 2024-11-21 DIAGNOSIS — F33.42 RECURRENT MAJOR DEPRESSIVE DISORDER, IN FULL REMISSION: ICD-10-CM

## 2024-11-21 DIAGNOSIS — Z12.4 ENCOUNTER FOR SCREENING FOR CERVICAL CANCER: ICD-10-CM

## 2024-11-21 DIAGNOSIS — D50.8 IRON DEFICIENCY ANEMIA SECONDARY TO INADEQUATE DIETARY IRON INTAKE: ICD-10-CM

## 2024-11-21 DIAGNOSIS — F41.8 OTHER SPECIFIED ANXIETY DISORDERS: ICD-10-CM

## 2024-11-21 DIAGNOSIS — F90.2 ADHD (ATTENTION DEFICIT HYPERACTIVITY DISORDER), COMBINED TYPE: Primary | ICD-10-CM

## 2024-11-21 DIAGNOSIS — F51.01 PRIMARY INSOMNIA: ICD-10-CM

## 2024-11-21 PROCEDURE — 3008F BODY MASS INDEX DOCD: CPT | Mod: CPTII,S$GLB,, | Performed by: FAMILY MEDICINE

## 2024-11-21 PROCEDURE — 3074F SYST BP LT 130 MM HG: CPT | Mod: CPTII,S$GLB,, | Performed by: FAMILY MEDICINE

## 2024-11-21 PROCEDURE — 99999 PR PBB SHADOW E&M-EST. PATIENT-LVL IV: CPT | Mod: PBBFAC,,, | Performed by: FAMILY MEDICINE

## 2024-11-21 PROCEDURE — 3079F DIAST BP 80-89 MM HG: CPT | Mod: CPTII,S$GLB,, | Performed by: FAMILY MEDICINE

## 2024-11-21 PROCEDURE — 3044F HG A1C LEVEL LT 7.0%: CPT | Mod: CPTII,S$GLB,, | Performed by: FAMILY MEDICINE

## 2024-11-21 PROCEDURE — 1159F MED LIST DOCD IN RCRD: CPT | Mod: CPTII,S$GLB,, | Performed by: FAMILY MEDICINE

## 2024-11-21 PROCEDURE — G2211 COMPLEX E/M VISIT ADD ON: HCPCS | Mod: S$GLB,,, | Performed by: FAMILY MEDICINE

## 2024-11-21 PROCEDURE — 99214 OFFICE O/P EST MOD 30 MIN: CPT | Mod: S$GLB,,, | Performed by: FAMILY MEDICINE

## 2024-11-21 PROCEDURE — 1160F RVW MEDS BY RX/DR IN RCRD: CPT | Mod: CPTII,S$GLB,, | Performed by: FAMILY MEDICINE

## 2024-11-21 RX ORDER — METHYLPHENIDATE HYDROCHLORIDE 36 MG/1
36 TABLET ORAL EVERY MORNING
Qty: 30 TABLET | Refills: 0 | Status: SHIPPED | OUTPATIENT
Start: 2024-11-21

## 2024-11-21 NOTE — PROGRESS NOTES
Chief Complaint  Chief Complaint   Patient presents with    Follow-up       HPI  Tamiko An is a 29 y.o. female with multiple medical diagnoses as listed in the medical history and problem list that presents for  in person visit.     History of Present Illness    CHIEF COMPLAINT:  - Patient presents for a follow-up visit to discuss medication adjustments and ongoing management of multiple conditions, including ADHD, depression, and anemia.    HPI:  Patient reports improvements in depression symptoms since starting medication, with no thoughts of impending doom or severe anxiety, and improved sleep. She was started on 18mg Concerta for ADHD, which wears off by 1 PM. Patient tried taking two 18mg doses for 2 days, which lasted until 5 PM without adverse effects.    Patient has been taking 65mg of iron every morning for anemia. Since starting iron supplementation, she reports improvements in shortness of breath and sleep quality, and no longer has dizziness upon standing.    For weight management, the patient has lost about 10 lbs since restarting methylphenidate (Concerta). Her weight fluctuates between 220 and 224 lbs. Patient has increased her exercise routine.    Patient initially had constipation with iron supplementation but has resolved this by adding Benefiber to her morning coffee.    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    Patient denies any current thoughts of impending doom, severe anxiety, jitteriness, or negative side effects from medication adjustments. Patient denies having type 2 diabetes.    MEDICATIONS:  - Wellbutrin, 1 tablet twice daily, for depression  - Trazodone, for sleep  - Concerta 18 mg, taken at 6 am, for ADHD  - Iron 65 mg, every morning  - Benefiber, added to morning coffee, for constipation    PMH:  - Abnormal weight  gain.  Abnormal menses.  Anemia.  Depression.  ADHD.  Anxiety.  - Last Pap: Upcoming appointment to be scheduled with Dalila for pap smear.  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 Other/Annual   Have you experienced any of the following:   Change in activity? Yes   Unexpected weight change? No   Neck pain? No   Hearing loss? No   Runny nose? No   Trouble swallowing? No   Eye discharge? No   Changes in vision? No   Chest tightness? No   Wheezing? No   Chest pain? No   Heart beating fast or racing? No   Blood in stool? No   Constipation? Yes   Vomiting? No   Diarrhea? No   Drinking much more than usual? No   Urinating much more than usual? No   Difficulty urinating? No   Blood in the urine? No   Menstrual problem? No   Painful urination? No   Joint swelling? No   Joint pain? No   Headaches? No   Weakness? No   Confusion? No   Feeling depressed? No     [facility] Peq S[facility]oh    6/22/2024  8:21 AM CDT - Filed by Patient   This questionnaire should take approximately 5 to 10 minutes to complete.  To begin, press Let's Begin and then press Continue. Let's Begin   On average, how many days per week do you engage in moderate to strenuous exercise (like a brisk walk)? 5 days   On average, how many minutes do you engage in exercise at this level? 30 min   Do you feel stress - tense, restless, nervous, or anxious, or unable to sleep at night because your mind is troubled all the time - these days? To some extent   How often do you feel lonely or isolated from those around you? Sometimes   How often do you need to have someone help you when you read instructions, pamphlets, or other written material from your doctor or pharmacy? Rarely   How hard is it for you to pay for the very basics like food, housing,  medical care, and heating? Not hard at all   In the past 12 months has the electric, gas, oil, or water company threatened to shut off services in your home? No   Within the past 12 months, you worried that your food would run out before you got the money to buy more. Never true   Within the past 12 months, the food you bought just didn't last and you didn't have money to get more. Never true   Has the lack of transportation kept you from medical appointments, meetings, work or from getting things needed for daily living? No   In the last 12 months, was there a time when you were not able to pay the mortgage or rent on time? No   In the last 12 months, was there a time when you did not have a steady place to sleep or slept in a shelter (including now)? No   How often do you have a drink containing alcohol? Monthly or less   How many drinks containing alcohol do you have on a typical day when you are drinking? 3 or 4   How often do you have six or more drinks on one occasion? Less than monthly            Pmh, Psh, Family Hx, Social Hx, HM updated in Epic Tabs today.    Review of Systems   Constitutional:  Positive for activity change. Negative for unexpected weight change.   HENT:  Negative for hearing loss, rhinorrhea and trouble swallowing.    Eyes:  Negative for discharge and visual disturbance.   Respiratory:  Negative for chest tightness and wheezing.    Cardiovascular:  Negative for chest pain and palpitations.   Gastrointestinal:  Negative for blood in stool, constipation, diarrhea and vomiting.   Endocrine: Negative for polydipsia and polyuria.   Genitourinary:  Negative for difficulty urinating, dysuria, hematuria and menstrual problem.   Musculoskeletal:  Negative for arthralgias, joint swelling and neck pain.   Neurological:  Negative for weakness and headaches.   Psychiatric/Behavioral:  Negative for confusion and dysphoric mood.         Objective:     Vitals:    11/21/24 1101   BP: 116/82   Patient  Position: Sitting   Pulse: 96   Resp: 18   Temp: 98.5 °F (36.9 °C)   SpO2: 97%   Weight: 103 kg (227 lb 2.9 oz)     Wt Readings from Last 10 Encounters:   11/21/24 103 kg (227 lb 2.9 oz)   10/31/24 106.4 kg (234 lb 10.9 oz)   10/23/24 106.4 kg (234 lb 10.9 oz)   06/25/24 104.9 kg (231 lb 2.4 oz)     Physical Exam    TEST RESULTS:  - Thyroid studies: June 25th, normal.  Iron levels: June 25th, low.  Hemoglobin: October 31st, anemia levels had not improved much from June.  Prediabetes/diabetes screening: June 25th, no evidence of prediabetes or diabetes.       Physical Exam  Vitals and nursing note reviewed.   Constitutional:       General: She is awake.      Appearance: Normal appearance. She is well-developed and well-groomed. She is morbidly obese.   HENT:      Head: Normocephalic and atraumatic.      Right Ear: Tympanic membrane and external ear normal.      Left Ear: Tympanic membrane and external ear normal.      Nose: Nose normal.   Eyes:      Conjunctiva/sclera: Conjunctivae normal.   Neck:      Thyroid: No thyromegaly or thyroid tenderness.   Cardiovascular:      Rate and Rhythm: Normal rate and regular rhythm.      Heart sounds: Normal heart sounds.   Pulmonary:      Effort: Pulmonary effort is normal. No accessory muscle usage.      Breath sounds: Normal breath sounds.   Musculoskeletal:      Cervical back: Normal range of motion and neck supple.   Neurological:      General: No focal deficit present.      Mental Status: She is alert. Mental status is at baseline.   Psychiatric:         Attention and Perception: Attention normal.         Mood and Affect: Mood normal.         Speech: Speech normal.         Behavior: Behavior normal. Behavior is cooperative.         Thought Content: Thought content normal.         Judgment: Judgment normal.         Assessment:     1. ADHD (attention deficit hyperactivity disorder), combined type    2. Recurrent major depressive disorder, in full remission    3. Mood disorder     4. Other specified anxiety disorders    5. Encounter for screening for cervical cancer    6. Iron deficiency anemia secondary to inadequate dietary iron intake    7. Primary insomnia        LABS:   Lab Results   Component Value Date    HGBA1C 5.4 06/25/2024      Lab Results   Component Value Date    CHOL 199 06/25/2024     Lab Results   Component Value Date    LDLCALC 143.6 06/25/2024     Lab Results   Component Value Date    WBC 9.02 10/31/2024    HGB 11.4 (L) 10/31/2024    HCT 36.5 (L) 10/31/2024     10/31/2024    CHOL 199 06/25/2024    TRIG 52 06/25/2024    HDL 45 06/25/2024    ALT 15 10/31/2024    AST 13 10/31/2024     10/31/2024    K 4.0 10/31/2024     10/31/2024    CREATININE 0.9 10/31/2024    BUN 10 10/31/2024    CO2 24 10/31/2024    TSH 0.869 06/25/2024    HGBA1C 5.4 06/25/2024       Plan:   Tamiko was seen today for follow-up.    Diagnoses and all orders for this visit:    ADHD (attention deficit hyperactivity disorder), combined type  -     methylphenidate HCl 36 MG CR tablet; Take 1 tablet (36 mg total) by mouth every morning.    Recurrent major depressive disorder, in full remission    Mood disorder    Other specified anxiety disorders    Encounter for screening for cervical cancer  -     Ambulatory referral/consult to Obstetrics / Gynecology; Future    Iron deficiency anemia secondary to inadequate dietary iron intake  -     Iron and TIBC; Future  -     CBC Auto Differential; Future  -     Ferritin; Future    Primary insomnia        Assessment & Plan    IMPRESSION:  - Increased Concerta from 18mg to 36mg daily to extend duration of effect for ADHD management  - Continued current antidepressant regimen (Wellbutrin, trazodone) due to reported improvement in symptoms  - Maintained iron supplementation (65mg daily) to address anemia  - Noted 10-pound weight loss since restarting methylphenidate  - Deferred metabolic clinic referral due to current progress with weight loss and absence  of diabetes    PLAN SUMMARY:  - Continue current exercise regimen  - Increase Concerta dosage to 36mg daily  - Continue iron supplementation at 65mg every morning  - Continue Wellbutrin, 1 tablet twice daily  - Maintain Benefiber supplementation in morning coffee  - Continue trazodone at current dose with refills provided  - Order iron labs to be completed prior to next visit  - Referral to gynecology nurse practitioner for pap smear and menstrual issues  - Follow up in 3 months, virtual or in-person option available  - Referral to mental health specialist for counseling available if desired  - Referral to metabolic clinic at [facility] offered if interested    WEIGHT MANAGEMENT:  - Noted the patient has lost approximately 10 lbs since starting methylphenidate, with weight fluctuating between 220 and 224 lbs.  - Acknowledged the patient's weight loss and increased exercise.  - Discussed the potential use of Zeppband for weight management, but noted insurance won't cover it without a type 2 diabetes diagnosis.  - Advised the patient to ensure adequate hydration and protein intake.  - Reiterated the availability of a referral to the metabolic clinic at [facility] with Dr. Yesenia Jordan if the patient is interested.  - Acknowledged the patient's reported increase in exercise.    ADHD:  - Increased Concerta dosage to 36mg daily.  - Noted the patient reports Concerta 18mg wears off by 1 PM, and that taking two 18mg doses lasted until 5 PM without negative side effects.  - Discussed follow-up plan for ADHD medication management.  - Provided instructions for refills and follow-up.    IRON DEFICIENCY:  - Continued iron supplementation at 65mg every morning.  - Ordered iron labs to be completed prior to the next visit in 3 months.  - Noted the patient reports taking 65mg of iron every morning and experiencing improved symptoms.  - Observed that recent hemoglobin levels from October 31st showed no significant  improvement from June.    CONSTIPATION:  - Advised the patient to maintain Benefiber supplementation in morning coffee to manage constipation.  - Acknowledged improvement in constipation due to Benefiber and increased activity.    ANXIETY:  - Noted the patient reports improvement in anxiety symptoms with current medication.    DEPRESSION:  - Continued Wellbutrin, 1 tablet twice daily.  - Noted the patient reports significant improvement in depression symptoms with current medication.    MENTAL HEALTH:  - Acknowledged the patient's interest in seeing a mental health specialist for counseling.  - Encouraged the   patient to make an appointment with a mental health specialist despite long wait times.    GYNECOLOGICAL CARE:  - Referred the patient to gynecology nurse practitioners Dalila or Jeremy for pap smear at the current location.  - Noted the patient mentions being on her period.  - Scheduled an appointment with gynecology nurse practitioner Dalila for pap smear and menstrual issues.    MEDICATIONS/SUPPLEMENTS:  - Continued trazodone at the current dose with ample refills provided.  - Noted the patient reports improved sleep with trazodone and iron supplementation.  - Discussed the importance of protein intake and hydration while on stimulant medication.    FOLLOW UP:  - Patient to continue current exercise regimen.  - Follow up in 3 months, can be virtual or in-person.  - Use [employer] to request medication refills when down to 4-5 pills.  - Contact office if dose adjustment needed before next appointment.         No image results found.    The ASCVD Risk score (Eyal DK, et al., 2019) failed to calculate for the following reasons:    The 2019 ASCVD risk score is only valid for ages 40 to 79    Follow-up: Follow up in about 3 months (around 2/21/2025) for f/u VV Warren Schulte NP 3mo iron labs, adhd .    I spent a total of   30    minutes face to face and non-face to face on the date of this visit.This includes time  preparing to see the patient (eg, review of tests, notes), obtaining and/or reviewing additional history from an independent historian and/or outside medical records, documenting clinical information in the electronic health record, independently interpreting results and/or communicating results to the patient/family/caregiver, or care coordinator.  Visit today included increased complexity associated with the care of the episodic problem addressed and managing the longitudinal care of the patient due to the serious and/or complex managed problem(s).    This note was generated with the assistance of ambient listening technology. Verbal consent was obtained by the patient and accompanying visitor(s) for the recording of patient appointment to facilitate this note. I attest to having reviewed and edited the generated note for accuracy, though some syntax or spelling errors may persist. Please contact the author of this note for any clarification.       There are no Patient Instructions on file for this visit.

## 2024-11-22 ENCOUNTER — TELEPHONE (OUTPATIENT)
Dept: OBSTETRICS AND GYNECOLOGY | Facility: CLINIC | Age: 29
End: 2024-11-22
Payer: COMMERCIAL

## 2024-11-22 ENCOUNTER — E-VISIT (OUTPATIENT)
Dept: PRIMARY CARE CLINIC | Facility: CLINIC | Age: 29
End: 2024-11-22
Payer: COMMERCIAL

## 2024-11-22 DIAGNOSIS — R11.2 NAUSEA AND VOMITING, UNSPECIFIED VOMITING TYPE: Primary | ICD-10-CM

## 2024-11-22 RX ORDER — ONDANSETRON 8 MG/1
8 TABLET, ORALLY DISINTEGRATING ORAL EVERY 12 HOURS PRN
Qty: 30 TABLET | Refills: 0 | Status: SHIPPED | OUTPATIENT
Start: 2024-11-22

## 2024-11-22 NOTE — PATIENT INSTRUCTIONS
Tamiko An        Thank you for using the e-visit platform to address your medication concerns.  Please see the below information in regards to your medication, refills and follow-up.      Medications Ordered This Encounter   Medications    ondansetron (ZOFRAN-ODT) 8 MG TbDL     Sig: Take 1 tablet (8 mg total) by mouth every 12 (twelve) hours as needed (nausea).     Dispense:  30 tablet     Refill:  0          [facility]/pharmacy #[id number] - [place] - [address]  [address]  [address]  [place] [address]  Phone: [phone] Fax: [phone]       Please let me know if you have further questions. Thank you for allowing me to care for you!     Sincerely,   Luz Liz MD

## 2024-11-22 NOTE — PROGRESS NOTES
Patient ID: Tamiko An is a 29 y.o. female.    Chief Complaint: General Illness (Entered automatically based on patient selection in [employer].)    The patient initiated a request through [employer] on 11/22/2024 for evaluation and management with a chief complaint of General Illness (Entered automatically based on patient selection in [employer].)     I evaluated the questionnaire submission on 11/22/2024.    [facility] Peq Evisit Supergroup-Common Problems    11/22/2024  6:14 AM CST - Filed by Patient   What do you need help with? Nausea/Vomiting/Diarrhea   Do you agree to participate in an E-Visit? Yes   If you have any of the following symptoms, please present to your local emergency room or call 911:  I acknowledge   Select all that apply: None of the above   What is the main issue you would like addressed today? Severe nausea and vomiting   Do you have diarrhea? Yes   How many stools have you passed in the last 24 hours? More than eight   Is there blood in your stool, or is your stool dark red or black? None of the above   Does your stool contain pus or mucus? No   Have you taken a laxative or a medicine to help you move your bowels lately? No   Are you vomiting? Yes, I am vomiting frequently   Are you able to keep down fluids? Yes, I can keep down some fluids.   Do you have belly pain? I have a little pain or no pain   Are you feeling dizzy or like you might pass out? Yes   When did your symptoms begin? 11/21/2024   Do you have a fever? Yes, I have a low fever (less than 101 degrees)   Are you having trouble walking or lifting yourself due to weakness from this illness?  No   Do any of the following apply to you? My urine is normal   Did your condition begin after a specific meal that may have caused the illness? Yes, after two to six hours   Please enter some information about your meal, including what you ate that may have caused your illness. Donna at a hockey game    Have you taken antibiotics in the last two  weeks? No   Have you been hospitalized in the past 2 months? No, I have not been hospitalized recently.   Do you work in a  center or healthcare environment? No   Does anyone you know have similar symptoms? No   Have you had a meal consisting of raw meat or fish in the week prior to your illness? No   Have you recently travelled to a place where you may have caught an illness? No   Have you tried any medication or other treatment for your symptoms? No   Provide any additional information you feel is important. Need a prescription for zofran and a drs note for missing work   Please attach any relevant images or files    Are you able to take your vital signs? No         Active Problem List with Overview Notes    Diagnosis Date Noted    ADHD (attention deficit hyperactivity disorder), combined type 10/31/2024    Recurrent major depressive disorder, in full remission 10/31/2024    Dysthymia 10/31/2024    Other specified anxiety disorders 10/31/2024    Insulin resistance 10/31/2024    Primary insomnia 10/24/2024    Irregular menstrual cycle 10/24/2024    Iron deficiency anemia secondary to inadequate dietary iron intake 10/24/2024    Class 3 severe obesity with body mass index (BMI) of 40.0 to 44.9 in adult 10/24/2024    BMI 40.0-44.9, adult 06/25/2024    Difficulty concentrating 06/25/2024    Mood disorder 06/25/2024    Dysmenorrhea 06/25/2024    Abnormal weight gain 06/25/2024    Positional lightheadedness 06/25/2024      Recent Labs Obtained:  No visits with results within 7 Day(s) from this visit.   Latest known visit with results is:   Lab Visit on 10/31/2024   Component Date Value Ref Range Status    WBC 10/31/2024 9.02  3.90 - 12.70 K/uL Final    RBC 10/31/2024 4.34  4.00 - 5.40 M/uL Final    Hemoglobin 10/31/2024 11.4 (L)  12.0 - 16.0 g/dL Final    Hematocrit 10/31/2024 36.5 (L)  37.0 - 48.5 % Final    MCV 10/31/2024 84  82 - 98 fL Final    MCH 10/31/2024 26.3 (L)  27.0 - 31.0 pg Final    MCHC  10/31/2024 31.2 (L)  32.0 - 36.0 g/dL Final    RDW 10/31/2024 13.6  11.5 - 14.5 % Final    Platelets 10/31/2024 368  150 - 450 K/uL Final    MPV 10/31/2024 9.5  9.2 - 12.9 fL Final    Immature Granulocytes 10/31/2024 0.2  0.0 - 0.5 % Final    Gran # (ANC) 10/31/2024 5.8  1.8 - 7.7 K/uL Final    Immature Grans (Abs) 10/31/2024 0.02  0.00 - 0.04 K/uL Final    Comment: Mild elevation in immature granulocytes is non specific and   can be seen in a variety of conditions including stress response,   acute inflammation, trauma and pregnancy. Correlation with other   laboratory and clinical findings is essential.      Lymph # 10/31/2024 2.5  1.0 - 4.8 K/uL Final    Mono # 10/31/2024 0.5  0.3 - 1.0 K/uL Final    Eos # 10/31/2024 0.2  0.0 - 0.5 K/uL Final    Baso # 10/31/2024 0.05  0.00 - 0.20 K/uL Final    nRBC 10/31/2024 0  0 /100 WBC Final    Gran % 10/31/2024 64.4  38.0 - 73.0 % Final    Lymph % 10/31/2024 27.2  18.0 - 48.0 % Final    Mono % 10/31/2024 5.9  4.0 - 15.0 % Final    Eosinophil % 10/31/2024 1.7  0.0 - 8.0 % Final    Basophil % 10/31/2024 0.6  0.0 - 1.9 % Final    Differential Method 10/31/2024 Automated   Final    Sodium 10/31/2024 139  136 - 145 mmol/L Final    Potassium 10/31/2024 4.0  3.5 - 5.1 mmol/L Final    Chloride 10/31/2024 107  95 - 110 mmol/L Final    CO2 10/31/2024 24  23 - 29 mmol/L Final    Glucose 10/31/2024 106  70 - 110 mg/dL Final    BUN 10/31/2024 10  6 - 20 mg/dL Final    Creatinine 10/31/2024 0.9  0.5 - 1.4 mg/dL Final    Calcium 10/31/2024 9.1  8.7 - 10.5 mg/dL Final    Total Protein 10/31/2024 7.2  6.0 - 8.4 g/dL Final    Albumin 10/31/2024 3.4 (L)  3.5 - 5.2 g/dL Final    Total Bilirubin 10/31/2024 0.1  0.1 - 1.0 mg/dL Final    Comment: For infants and newborns, interpretation of results should be based  on gestational age, weight and in agreement with clinical  observations.    Premature Infant recommended reference ranges:  Up to 24 hours.............<8.0 mg/dL  Up to 48  hours............<12.0 mg/dL  3-5 days..................<15.0 mg/dL  6-29 days.................<15.0 mg/dL      Alkaline Phosphatase 10/31/2024 99  40 - 150 U/L Final    AST 10/31/2024 13  10 - 40 U/L Final    ALT 10/31/2024 15  10 - 44 U/L Final    eGFR 10/31/2024 >60.0  >60 mL/min/1.73 m^2 Final    Anion Gap 10/31/2024 8  8 - 16 mmol/L Final       Encounter Diagnosis   Name Primary?    Nausea and vomiting, unspecified vomiting type Yes        No orders of the defined types were placed in this encounter.     Medications Ordered This Encounter   Medications    ondansetron (ZOFRAN-ODT) 8 MG TbDL     Sig: Take 1 tablet (8 mg total) by mouth every 12 (twelve) hours as needed (nausea).     Dispense:  30 tablet     Refill:  0      Tamiko was seen today for general illness.    Diagnoses and all orders for this visit:    Nausea and vomiting, unspecified vomiting type  -     ondansetron (ZOFRAN-ODT) 8 MG TbDL; Take 1 tablet (8 mg total) by mouth every 12 (twelve) hours as needed (nausea).       Follow up if symptoms worsen or fail to improve.      E-Visit Time Tracking:    Day 1 Time (in minutes): 11    Total Time (in minutes): 11              Patient Instructions   Tamiko Nataliya        Thank you for using the e-visit platform to address your medication concerns.  Please see the below information in regards to your medication, refills and follow-up.      Medications Ordered This Encounter   Medications    ondansetron (ZOFRAN-ODT) 8 MG TbDL     Sig: Take 1 tablet (8 mg total) by mouth every 12 (twelve) hours as needed (nausea).     Dispense:  30 tablet     Refill:  0          [facility]/pharmacy #[id number] - [place] - [address]  [address]  [address]  [place] [address]  Phone: [phone] Fax: [phone]       Please let me know if you have further questions. Thank you for allowing me to care for you!     Sincerely,   Luz Liz MD

## 2024-12-28 DIAGNOSIS — F90.2 ADHD (ATTENTION DEFICIT HYPERACTIVITY DISORDER), COMBINED TYPE: ICD-10-CM

## 2024-12-28 NOTE — TELEPHONE ENCOUNTER
No care due was identified.  [facility] Embedded Care Due Messages. Reference number: [id number].   12/28/2024 8:34:58 AM CST

## 2024-12-30 RX ORDER — METHYLPHENIDATE HYDROCHLORIDE 36 MG/1
36 TABLET ORAL EVERY MORNING
Qty: 30 TABLET | Refills: 0 | Status: SHIPPED | OUTPATIENT
Start: 2024-12-30

## 2025-02-03 DIAGNOSIS — F90.2 ADHD (ATTENTION DEFICIT HYPERACTIVITY DISORDER), COMBINED TYPE: ICD-10-CM

## 2025-02-03 NOTE — TELEPHONE ENCOUNTER
No care due was identified.  [facility] Embedded Care Due Messages. Reference number: [id number].   2/03/2025 4:54:31 PM CST

## 2025-02-04 RX ORDER — METHYLPHENIDATE HYDROCHLORIDE 36 MG/1
36 TABLET ORAL EVERY MORNING
Qty: 30 TABLET | Refills: 0 | Status: SHIPPED | OUTPATIENT
Start: 2025-02-04

## 2025-02-25 ENCOUNTER — PATIENT MESSAGE (OUTPATIENT)
Dept: PRIMARY CARE CLINIC | Facility: CLINIC | Age: 30
End: 2025-02-25

## 2025-02-25 ENCOUNTER — E-VISIT (OUTPATIENT)
Dept: URGENT CARE | Facility: CLINIC | Age: 30
End: 2025-02-25
Payer: COMMERCIAL

## 2025-02-25 DIAGNOSIS — J01.00 SUBACUTE MAXILLARY SINUSITIS: ICD-10-CM

## 2025-02-25 DIAGNOSIS — R05.2 SUBACUTE COUGH: Primary | ICD-10-CM

## 2025-02-25 RX ORDER — PROMETHAZINE HYDROCHLORIDE AND DEXTROMETHORPHAN HYDROBROMIDE 6.25; 15 MG/5ML; MG/5ML
5 SYRUP ORAL EVERY 6 HOURS PRN
Qty: 118 ML | Refills: 1 | Status: SHIPPED | OUTPATIENT
Start: 2025-02-25 | End: 2025-03-07

## 2025-02-25 RX ORDER — DOXYCYCLINE HYCLATE 100 MG
100 TABLET ORAL 2 TIMES DAILY
Qty: 14 TABLET | Refills: 0 | Status: SHIPPED | OUTPATIENT
Start: 2025-02-25 | End: 2025-03-04

## 2025-02-25 RX ORDER — OXYMETAZOLINE HCL 0.05 %
2 SPRAY, NON-AEROSOL (ML) NASAL 2 TIMES DAILY
Qty: 6 ML | Refills: 0 | Status: SHIPPED | OUTPATIENT
Start: 2025-02-25 | End: 2025-02-28

## 2025-02-25 NOTE — PROGRESS NOTES
Patient ID: Tamiko An is a 29 y.o. female.    Chief Complaint: General Illness (Entered automatically based on patient selection in [employer].)          274}  The patient initiated a request through [employer] on 2/25/2025 for evaluation and management with a chief complaint of General Illness (Entered automatically based on patient selection in [employer].)     I evaluated the questionnaire submission on 02/25/2025 .    Total Time (in minutes): 12     [facility] Peq Evisit Supergroup-Cough And Cold    2/25/2025  8:20 AM CST - Filed by Patient   What do you need help with? Cough, Cold, Sore Throat   Do you agree to participate in an E-Visit? Yes   If you have any of the following symptoms, go to your local emergency room or call 911: I acknowledge   Do you have any of the following pregnancy-related conditions? None   What is the main issue you would like addressed today? Severe cough and heavy congestion   Do you think you might have COVID-19 or the Flu? No   Have you tested positive for COVID-19 or Flu? No   What symptoms do you currently have?  Cough;  Headache;  Stuffy nose;  Runny nose   Describe your cough: Contains mucus;  Does not stop   Describe your mucus: Green;  Yellow;  Clear;  Thick   Have you ever smoked? Smoked in the past   Have you had a fever? No   When did your symptoms first appear? 2/20/2025   In the last two weeks, have you been in close contact with someone who has COVID-19, the Flu, or strep throat? No   List what you have done or taken to help your symptoms. Musinex and dayquil   On a scale of 1-10, where 10 is the worst you can imagine, how severe are your symptoms? (range: 1 - 10) 7   Have your symptoms changed since they first started? No change   Do you have transportation to an [facility] location to get tested for COVID-19? No   Provide any additional information you feel is important. Severe cough, sore throat, green and yellow thick mucus, heavy congestion and migraines   Please attach any  relevant images or files    Are you able to take your vital signs? No          Active Problem List with Overview Notes    Diagnosis Date Noted    ADHD (attention deficit hyperactivity disorder), combined type 10/31/2024    Recurrent major depressive disorder, in full remission 10/31/2024    Dysthymia 10/31/2024    Other specified anxiety disorders 10/31/2024    Insulin resistance 10/31/2024    Primary insomnia 10/24/2024    Irregular menstrual cycle 10/24/2024    Iron deficiency anemia secondary to inadequate dietary iron intake 10/24/2024    Class 3 severe obesity with body mass index (BMI) of 40.0 to 44.9 in adult 10/24/2024    BMI 40.0-44.9, adult 2024    Difficulty concentrating 2024    Mood disorder 2024    Dysmenorrhea 2024    Abnormal weight gain 2024    Positional lightheadedness 2024      Recent Labs Obtained:  Lab Results   Component Value Date    WBC 9.02 10/31/2024    HGB 11.4 (L) 10/31/2024    HCT 36.5 (L) 10/31/2024    MCV 84 10/31/2024     10/31/2024     10/31/2024    K 4.0 10/31/2024     10/31/2024    CREATININE 0.9 10/31/2024    EGFRNORACEVR >60.0 10/31/2024    HGBA1C 5.4 2024    TSH 0.869 2024      Review of patient's allergies indicates:   Allergen Reactions    Vancomycin analogues Anaphylaxis    Latex, natural rubber Hives    Penicillin g Hives       Encounter Diagnoses   Name Primary?    Subacute cough Yes    Subacute maxillary sinusitis         No orders of the defined types were placed in this encounter.     Medications Ordered This Encounter   Medications    doxycycline (VIBRA-TABS) 100 MG tablet     Sig: Take 1 tablet (100 mg total) by mouth 2 (two) times daily. for 7 days     Dispense:  14 tablet     Refill:  0    oxymetazoline (AFRIN, OXYMETAZOLINE,) 0.05 % nasal spray     Si sprays by Nasal route 2 (two) times daily. Do not take over 3 days due to rebound congestion for 3 days     Dispense:  6 mL     Refill:  0     promethazine-dextromethorphan (PROMETHAZINE-DM) 6.25-15 mg/5 mL Syrp     Sig: Take 5 mLs by mouth every 6 (six) hours as needed (cough).     Dispense:  118 mL     Refill:  1        E-Visit Time Tracking:    Day 1 Time (in minutes): 12    Total Time (in minutes): 12      274}

## 2025-03-19 ENCOUNTER — TELEPHONE (OUTPATIENT)
Dept: PRIMARY CARE CLINIC | Facility: CLINIC | Age: 30
End: 2025-03-19
Payer: MEDICAID

## 2025-03-19 ENCOUNTER — PATIENT MESSAGE (OUTPATIENT)
Dept: PRIMARY CARE CLINIC | Facility: CLINIC | Age: 30
End: 2025-03-19
Payer: MEDICAID

## 2025-03-19 NOTE — TELEPHONE ENCOUNTER
----- Message from Warren Schulte NP sent at 3/19/2025  3:26 PM CDT -----  1. Called to  get attached patient scheduled patient for a VV with me tomorrow or Friday to discuss weaning medications during pregnancy.Sincerely,Warren Schulte NP

## 2025-03-20 ENCOUNTER — LAB VISIT (OUTPATIENT)
Dept: LAB | Facility: HOSPITAL | Age: 30
End: 2025-03-20
Attending: NURSE PRACTITIONER
Payer: MEDICAID

## 2025-03-20 ENCOUNTER — TELEPHONE (OUTPATIENT)
Dept: OBSTETRICS AND GYNECOLOGY | Facility: CLINIC | Age: 30
End: 2025-03-20
Payer: MEDICAID

## 2025-03-20 ENCOUNTER — RESULTS FOLLOW-UP (OUTPATIENT)
Dept: PRIMARY CARE CLINIC | Facility: CLINIC | Age: 30
End: 2025-03-20

## 2025-03-20 ENCOUNTER — OFFICE VISIT (OUTPATIENT)
Dept: PRIMARY CARE CLINIC | Facility: CLINIC | Age: 30
End: 2025-03-20
Payer: MEDICAID

## 2025-03-20 DIAGNOSIS — F41.8 OTHER SPECIFIED ANXIETY DISORDERS: ICD-10-CM

## 2025-03-20 DIAGNOSIS — Z34.90 PREGNANCY WITH UNCERTAIN DATES, UNSPECIFIED TRIMESTER: Primary | ICD-10-CM

## 2025-03-20 DIAGNOSIS — F33.42 RECURRENT MAJOR DEPRESSIVE DISORDER, IN FULL REMISSION: ICD-10-CM

## 2025-03-20 DIAGNOSIS — F90.2 ADHD (ATTENTION DEFICIT HYPERACTIVITY DISORDER), COMBINED TYPE: Primary | ICD-10-CM

## 2025-03-20 DIAGNOSIS — Z3A.01 LESS THAN 8 WEEKS GESTATION OF PREGNANCY: ICD-10-CM

## 2025-03-20 DIAGNOSIS — F39 MOOD DISORDER: ICD-10-CM

## 2025-03-20 LAB
ALBUMIN SERPL BCP-MCNC: 3.5 G/DL (ref 3.5–5.2)
ALP SERPL-CCNC: 82 U/L (ref 40–150)
ALT SERPL W/O P-5'-P-CCNC: 14 U/L (ref 10–44)
ANION GAP SERPL CALC-SCNC: 9 MMOL/L (ref 8–16)
AST SERPL-CCNC: 17 U/L (ref 10–40)
BASOPHILS # BLD AUTO: 0.06 K/UL (ref 0–0.2)
BASOPHILS NFR BLD: 0.5 % (ref 0–1.9)
BILIRUB SERPL-MCNC: 0.1 MG/DL (ref 0.1–1)
BUN SERPL-MCNC: 13 MG/DL (ref 6–20)
CALCIUM SERPL-MCNC: 9.4 MG/DL (ref 8.7–10.5)
CHLORIDE SERPL-SCNC: 105 MMOL/L (ref 95–110)
CO2 SERPL-SCNC: 22 MMOL/L (ref 23–29)
CREAT SERPL-MCNC: 0.7 MG/DL (ref 0.5–1.4)
DIFFERENTIAL METHOD BLD: ABNORMAL
EOSINOPHIL # BLD AUTO: 0.2 K/UL (ref 0–0.5)
EOSINOPHIL NFR BLD: 1.5 % (ref 0–8)
ERYTHROCYTE [DISTWIDTH] IN BLOOD BY AUTOMATED COUNT: 13.2 % (ref 11.5–14.5)
EST. GFR  (NO RACE VARIABLE): >60 ML/MIN/1.73 M^2
GLUCOSE SERPL-MCNC: 71 MG/DL (ref 70–110)
HCG INTACT+B SERPL-ACNC: 1575 MIU/ML
HCT VFR BLD AUTO: 38.5 % (ref 37–48.5)
HGB BLD-MCNC: 12 G/DL (ref 12–16)
IMM GRANULOCYTES # BLD AUTO: 0.04 K/UL (ref 0–0.04)
IMM GRANULOCYTES NFR BLD AUTO: 0.3 % (ref 0–0.5)
LYMPHOCYTES # BLD AUTO: 3.4 K/UL (ref 1–4.8)
LYMPHOCYTES NFR BLD: 29.3 % (ref 18–48)
MCH RBC QN AUTO: 27.1 PG (ref 27–31)
MCHC RBC AUTO-ENTMCNC: 31.2 G/DL (ref 32–36)
MCV RBC AUTO: 87 FL (ref 82–98)
MONOCYTES # BLD AUTO: 0.7 K/UL (ref 0.3–1)
MONOCYTES NFR BLD: 6.3 % (ref 4–15)
NEUTROPHILS # BLD AUTO: 7.2 K/UL (ref 1.8–7.7)
NEUTROPHILS NFR BLD: 62.1 % (ref 38–73)
NRBC BLD-RTO: 0 /100 WBC
PLATELET # BLD AUTO: 384 K/UL (ref 150–450)
PMV BLD AUTO: 8.9 FL (ref 9.2–12.9)
POTASSIUM SERPL-SCNC: 4.3 MMOL/L (ref 3.5–5.1)
PROT SERPL-MCNC: 7.5 G/DL (ref 6–8.4)
RBC # BLD AUTO: 4.43 M/UL (ref 4–5.4)
SODIUM SERPL-SCNC: 136 MMOL/L (ref 136–145)
WBC # BLD AUTO: 11.51 K/UL (ref 3.9–12.7)

## 2025-03-20 PROCEDURE — 36415 COLL VENOUS BLD VENIPUNCTURE: CPT | Performed by: NURSE PRACTITIONER

## 2025-03-20 PROCEDURE — 1159F MED LIST DOCD IN RCRD: CPT | Mod: CPTII,95,, | Performed by: NURSE PRACTITIONER

## 2025-03-20 PROCEDURE — 84702 CHORIONIC GONADOTROPIN TEST: CPT | Performed by: NURSE PRACTITIONER

## 2025-03-20 PROCEDURE — 80053 COMPREHEN METABOLIC PANEL: CPT | Performed by: NURSE PRACTITIONER

## 2025-03-20 PROCEDURE — 98005 SYNCH AUDIO-VIDEO EST LOW 20: CPT | Mod: 95,,, | Performed by: NURSE PRACTITIONER

## 2025-03-20 PROCEDURE — 1160F RVW MEDS BY RX/DR IN RCRD: CPT | Mod: CPTII,95,, | Performed by: NURSE PRACTITIONER

## 2025-03-20 PROCEDURE — 85025 COMPLETE CBC W/AUTO DIFF WBC: CPT | Performed by: NURSE PRACTITIONER

## 2025-03-20 RX ORDER — METHYLPHENIDATE HYDROCHLORIDE 27 MG/1
27 TABLET ORAL EVERY MORNING
Qty: 30 TABLET | Refills: 0 | Status: SHIPPED | OUTPATIENT
Start: 2025-03-20

## 2025-03-20 NOTE — TELEPHONE ENCOUNTER
----- Message from Luz sent at 3/20/2025  2:40 PM CDT -----  Type:  Patient Returning CallWho Called:pt Who Left Message for Patient:Patti Does the patient know what this is regarding?:yes Would the patient rather a call back or a response via [facility]? CallBest Call Back Number: [phone] Information:

## 2025-03-20 NOTE — TELEPHONE ENCOUNTER
Petra Oliver MD Hunt, Aaron Q., NP; P Melody [facility] Staff  Thank you.  Yes, we can get her in with a CNM for an initial OB visit with dating ultrasound.    To staff, please schedule initial OB visit and dating ultrasound with CNM in 2 weeks.    Left message for patient to return call to [phone].

## 2025-03-20 NOTE — PROGRESS NOTES
Patient ID: Tamiko An is a 29 y.o. female.    Chief Complaint: Follow-up (Weaning medications )    History of Present Illness    Ms. An presents today for medication management during newly discovered pregnancy    She is currently 5 approx weeks pregnant, confirmed with multiple positive home pregnancy tests. This is her first pregnancy.    She is currently taking Concerta 36 mg, Wellbutrin, and Trazodone for insomnia.      ROS:  General: -fever, -chills, -fatigue, -weight gain, -weight loss  Eyes: -vision changes, -redness, -discharge  ENT: -ear pain, -nasal congestion, -sore throat  Cardiovascular: -chest pain, -palpitations, -lower extremity edema  Respiratory: -cough, -shortness of breath  Gastrointestinal: -abdominal pain, -nausea, -vomiting, -diarrhea, -constipation, -blood in stool  Genitourinary: -dysuria, -hematuria, -frequency  Musculoskeletal: -joint pain, -muscle pain  Skin: -rash, -lesion  Neurological: -headache, -dizziness, -numbness, -tingling  Psychiatric: +anxiety, -depression, +sleep difficulty         Wt Readings from Last 10 Encounters:   11/21/24 103 kg (227 lb 2.9 oz)   10/31/24 106.4 kg (234 lb 10.9 oz)   10/23/24 106.4 kg (234 lb 10.9 oz)   06/25/24 104.9 kg (231 lb 2.4 oz)       The ASCVD Risk score (Eyal DK, et al., 2019) failed to calculate for the following reasons:    The 2019 ASCVD risk score is only valid for ages 40 to 79    PAST MEDICAL HISTORY:  Past Medical History:   Diagnosis Date    ADHD     Back pain     Dr. Luo-chiropractor    Depression     Generalized anxiety disorder        PAST SURGICAL HISTORY:  Past Surgical History:   Procedure Laterality Date    APPENDECTOMY  2016       SOCIAL HISTORY:  Social History[1]    FAMILY HISTORY:  Family History   Problem Relation Name Age of Onset    Diabetes Mother      Arthritis Mother      Hypotension Mother      Diabetes Father      Arthritis Maternal Grandmother      Coronary artery disease Maternal Grandfather       Arthritis Maternal Grandfather         ALLERGIES AND MEDICATIONS: updated and reviewed.  Review of patient's allergies indicates:   Allergen Reactions    Vancomycin analogues Anaphylaxis    Latex, natural rubber Hives    Penicillin g Hives     Current Medications[2]      Physical Exam  Constitutional:       Appearance: Normal appearance.   HENT:      Head: Normocephalic and atraumatic.   Pulmonary:      Effort: Pulmonary effort is normal.   Neurological:      Mental Status: She is alert and oriented to person, place, and time.   Psychiatric:         Mood and Affect: Mood normal.          Assessment:   LABS:   Lab Results   Component Value Date    HGBA1C 5.4 06/25/2024      Lab Results   Component Value Date    CHOL 199 06/25/2024     Lab Results   Component Value Date    LDLCALC 143.6 06/25/2024     Lab Results   Component Value Date    WBC 9.02 10/31/2024    HGB 11.4 (L) 10/31/2024    HCT 36.5 (L) 10/31/2024     10/31/2024    CHOL 199 06/25/2024    TRIG 52 06/25/2024    HDL 45 06/25/2024    ALT 15 10/31/2024    AST 13 10/31/2024     10/31/2024    K 4.0 10/31/2024     10/31/2024    CREATININE 0.9 10/31/2024    BUN 10 10/31/2024    CO2 24 10/31/2024    TSH 0.869 06/25/2024    HGBA1C 5.4 06/25/2024       Plan:   Tamiko was seen today for follow-up.    Diagnoses and all orders for this visit:    ADHD (attention deficit hyperactivity disorder), combined type  -     methylphenidate HCl (CONCERTA) 27 MG CR tablet; Take 1 tablet (27 mg total) by mouth every morning.    Less than 8 weeks gestation of pregnancy  -     HCG, Quantitative; Future  -     Pregnancy, urine rapid; Future  -     Comprehensive Metabolic Panel; Future  -     CBC Auto Differential; Future    Mood disorder    Other specified anxiety disorders    Recurrent major depressive disorder, in full remission        Assessment & Plan      IMPRESSION:  -Discussed medication management for patient who is approx 5 weeks pregnant and currently on  Concerta, Wellbutrin, and Trazodone.  -Will wean off Concerta over next 3 months due to pregnancy.  -Determined to continue Wellbutrin at current dose for anxiety management during pregnancy.  -Continued Trazodone at current dose for sleep issues.  -Plan to potentially resume Concerta 1 month postpartum, depending on breastfeeding status.    MENTAL HEALTH MANAGEMENT DURING PREGNANCY:  - Decreased Concerta from 36 mg to 27 mg tablets daily.  - Plan to further decrease to 18 mg next month, then discontinue the following month.  - Discussed potential resumption of Concerta 1 month postpartum, depending on breastfeeding status.  - Ms. An is currently 5 weeks pregnant and taking Concerta, Wellbutrin, and Trazodone for mental health management.  - Discussed the need to manage mental health during pregnancy while considering medication safety.  - Developed a plan to wean the patient off Concerta over 3 months, starting with a decrease from 36 mg to 27 mg, then to 18 mg, and finally discontinuing.  - Will continue Wellbutrin and Trazodone, as they are deemed safe during pregnancy.    ANXIETY MANAGEMENT:  - Assessed the patient's anxiety, which is being managed with medications during pregnancy.  - Continued Wellbutrin at current dose for anxiety management during pregnancy.  - Ms. An's anxiety is being managed with Wellbutrin and Concerta.  - Developed a plan to continue Wellbutrin for anxiety management during pregnancy while weaning off Concerta.    INSOMNIA MANAGEMENT:  - Continued Trazodone at current dose for insomnia management.  - Ms. An is taking Trazodone for sleep issues.  - Determined that Trazodone is safe to continue during pregnancy for insomnia management.    PREGNANCY CONFIRMATION AND CARE:  - Ordered HCG blood test and urine pregnancy test to confirm pregnancy.  - Ordered baseline kidney and liver function tests, as well as complete blood count.  - Referred the patient to obstetrics for  pregnancy management and ultrasound.        FOLLOW-UP:  -RTC as needed    During this audiovisual appt patient appears to be at home.    Each patient to whom he or she provides medical services by telemedicine is:  (1) informed of the relationship between the health care provider and patient and the respective role of any other health care provider with respect to management of the patient; and (2) notified that he or she may decline to receive medical services by telemedicine and may withdraw from such care at any time.    I spent a total of 21 minutes face to face and non-face to face on the date of this visit.This includes time preparing to see the patient (eg, review of tests, notes), obtaining and/or reviewing additional history from an independent historian and/or outside medical records, documenting clinical information in the electronic health record, independently interpreting results and/or communicating results to the patient/family/caregiver, or care coordinator.  Visit today included increased complexity associated with the care of the episodic problem addressed and managing the longitudinal care of the patient due to the serious and/or complex managed problem(s).      This note was generated with the assistance of ambient listening technology. Verbal consent was obtained by the patient and accompanying visitor(s) for the recording of patient appointment to facilitate this note. I attest to having reviewed and edited the generated note for accuracy, though some syntax or spelling errors may persist. Please contact the author of this note for any clarification.      Sincerely,  Warren Schulte NP   Answers submitted by the patient for this visit:  Review of Systems Questionnaire (Submitted on 3/20/2025)  activity change: No  unexpected weight change: No  neck pain: No  hearing loss: No  rhinorrhea: No  trouble swallowing: No  eye discharge: No  visual disturbance: No  chest tightness: No  wheezing:  No  chest pain: No  palpitations: No  blood in stool: No  constipation: No  vomiting: No  diarrhea: No  polydipsia: No  polyuria: No  difficulty urinating: No  hematuria: No  menstrual problem: No  dysuria: No  joint swelling: No  arthralgias: No  headaches: No  weakness: No  confusion: No  dysphoric mood: No         [1]   Social History  Socioeconomic History    Marital status: Single   Tobacco Use    Smoking status: Never     Passive exposure: Never    Smokeless tobacco: Never     Social Drivers of Health     Financial Resource Strain: Low Risk  (3/15/2025)    Overall Financial Resource Strain (CARDIA)     Difficulty of Paying Living Expenses: Not hard at all   Food Insecurity: No Food Insecurity (3/15/2025)    Hunger Vital Sign     Worried About Running Out of Food in the Last Year: Never true     Ran Out of Food in the Last Year: Never true   Transportation Needs: No Transportation Needs (3/15/2025)    PRAPARE - Transportation     Lack of Transportation (Medical): No     Lack of Transportation (Non-Medical): No   Physical Activity: Insufficiently Active (3/15/2025)    Exercise Vital Sign     Days of Exercise per Week: 2 days     Minutes of Exercise per Session: 30 min   Stress: Stress Concern Present (3/15/2025)    [employer] [place] [employer] - Occupational Stress Questionnaire     Feeling of Stress : To some extent   Housing Stability: Low Risk  (3/15/2025)    Housing Stability Vital Sign     Unable to Pay for Housing in the Last Year: No     Number of Times Moved in the Last Year: 0     Homeless in the Last Year: No   [2]   Current Outpatient Medications   Medication Sig Dispense Refill    buPROPion (WELLBUTRIN SR) 100 MG TBSR 12 hr tablet TAKE ONE TABLET DAILY FOR SEVEN DAYS THEN INCREASE TO ONE TABLET TWICE A DAY THEREAFTER 60 tablet 11    calcium carbonate 1250 MG capsule Take 1,250 mg by mouth 2 (two) times daily with meals.      ferrous sulfate (FEOSOL) Tab tablet Take 1 tablet by mouth daily  with breakfast.      magnesium gluconate 27.5 mg magne- sium (500 mg) Tab Take by mouth once.      methylphenidate HCl (CONCERTA) 27 MG CR tablet Take 1 tablet (27 mg total) by mouth every morning. 30 tablet 0    methylphenidate HCl 18 MG CR tablet START LOW DOSE METHYLPHENIDATE AT 18 MG DAILY THEN INCREASE TO HIGHER DOSE AFTER 30 DAYS IF NEEDED ONCE EVALUATED 30 tablet 0    methylphenidate HCl 36 MG CR tablet Take 1 tablet (36 mg total) by mouth every morning. 30 tablet 0    multivitamin (THERAGRAN) per tablet Take 1 tablet by mouth once daily.      ondansetron (ZOFRAN-ODT) 8 MG TbDL Take 1 tablet (8 mg total) by mouth every 12 (twelve) hours as needed (nausea). 30 tablet 0    traZODone (DESYREL) 50 MG tablet Take 1 tablet (50 mg total) by mouth every evening. 30 tablet 11     No current facility-administered medications for this visit.

## 2025-03-24 ENCOUNTER — PATIENT OUTREACH (OUTPATIENT)
Dept: ADMINISTRATIVE | Facility: HOSPITAL | Age: 30
End: 2025-03-24
Payer: MEDICAID

## 2025-03-24 NOTE — PROGRESS NOTES
VBHM Score: 1     Cervical Cancer Screening                 Patient has GYN appt scheduled for 4.3.25

## 2025-04-03 ENCOUNTER — INITIAL PRENATAL (OUTPATIENT)
Dept: OBSTETRICS AND GYNECOLOGY | Facility: CLINIC | Age: 30
End: 2025-04-03
Payer: MEDICAID

## 2025-04-03 ENCOUNTER — PROCEDURE VISIT (OUTPATIENT)
Dept: OBSTETRICS AND GYNECOLOGY | Facility: CLINIC | Age: 30
End: 2025-04-03
Payer: MEDICAID

## 2025-04-03 ENCOUNTER — LAB VISIT (OUTPATIENT)
Dept: LAB | Facility: HOSPITAL | Age: 30
End: 2025-04-03
Payer: MEDICAID

## 2025-04-03 VITALS
WEIGHT: 226.19 LBS | DIASTOLIC BLOOD PRESSURE: 74 MMHG | BODY MASS INDEX: 41.37 KG/M2 | SYSTOLIC BLOOD PRESSURE: 120 MMHG

## 2025-04-03 DIAGNOSIS — Z3A.01 7 WEEKS GESTATION OF PREGNANCY: ICD-10-CM

## 2025-04-03 DIAGNOSIS — F33.42 RECURRENT MAJOR DEPRESSIVE DISORDER, IN FULL REMISSION: ICD-10-CM

## 2025-04-03 DIAGNOSIS — E88.819 INSULIN RESISTANCE: ICD-10-CM

## 2025-04-03 DIAGNOSIS — D50.8 IRON DEFICIENCY ANEMIA SECONDARY TO INADEQUATE DIETARY IRON INTAKE: ICD-10-CM

## 2025-04-03 DIAGNOSIS — F51.01 PRIMARY INSOMNIA: ICD-10-CM

## 2025-04-03 DIAGNOSIS — F41.8 OTHER SPECIFIED ANXIETY DISORDERS: ICD-10-CM

## 2025-04-03 DIAGNOSIS — Z3A.01 7 WEEKS GESTATION OF PREGNANCY: Primary | ICD-10-CM

## 2025-04-03 DIAGNOSIS — N92.6 IRREGULAR MENSTRUAL CYCLE: ICD-10-CM

## 2025-04-03 DIAGNOSIS — F90.2 ADHD (ATTENTION DEFICIT HYPERACTIVITY DISORDER), COMBINED TYPE: ICD-10-CM

## 2025-04-03 DIAGNOSIS — Z34.90 PREGNANCY WITH UNCERTAIN DATES, UNSPECIFIED TRIMESTER: ICD-10-CM

## 2025-04-03 PROBLEM — F39 MOOD DISORDER: Status: RESOLVED | Noted: 2024-06-25 | Resolved: 2025-04-03

## 2025-04-03 PROBLEM — N94.6 DYSMENORRHEA: Status: RESOLVED | Noted: 2024-06-25 | Resolved: 2025-04-03

## 2025-04-03 PROBLEM — R63.5 ABNORMAL WEIGHT GAIN: Status: RESOLVED | Noted: 2024-06-25 | Resolved: 2025-04-03

## 2025-04-03 PROBLEM — R42 POSITIONAL LIGHTHEADEDNESS: Status: RESOLVED | Noted: 2024-06-25 | Resolved: 2025-04-03

## 2025-04-03 PROBLEM — F34.1 DYSTHYMIA: Status: RESOLVED | Noted: 2024-10-31 | Resolved: 2025-04-03

## 2025-04-03 LAB
INDIRECT COOMBS: NORMAL
RH BLD: NORMAL
SPECIMEN OUTDATE: NORMAL

## 2025-04-03 PROCEDURE — 99999 PR PBB SHADOW E&M-EST. PATIENT-LVL III: CPT | Mod: PBBFAC,,,

## 2025-04-03 PROCEDURE — 83021 HEMOGLOBIN CHROMOTOGRAPHY: CPT

## 2025-04-03 PROCEDURE — 86850 RBC ANTIBODY SCREEN: CPT

## 2025-04-03 PROCEDURE — 87086 URINE CULTURE/COLONY COUNT: CPT

## 2025-04-03 PROCEDURE — 86762 RUBELLA ANTIBODY: CPT

## 2025-04-03 PROCEDURE — 76801 OB US < 14 WKS SINGLE FETUS: CPT | Mod: PBBFAC | Performed by: OBSTETRICS & GYNECOLOGY

## 2025-04-03 PROCEDURE — 80074 ACUTE HEPATITIS PANEL: CPT

## 2025-04-03 PROCEDURE — 83036 HEMOGLOBIN GLYCOSYLATED A1C: CPT

## 2025-04-03 PROCEDURE — 87389 HIV-1 AG W/HIV-1&-2 AB AG IA: CPT

## 2025-04-03 PROCEDURE — 36415 COLL VENOUS BLD VENIPUNCTURE: CPT

## 2025-04-03 PROCEDURE — 99213 OFFICE O/P EST LOW 20 MIN: CPT | Mod: PBBFAC,25

## 2025-04-03 PROCEDURE — 87661 TRICHOMONAS VAGINALIS AMPLIF: CPT

## 2025-04-03 PROCEDURE — 85025 COMPLETE CBC W/AUTO DIFF WBC: CPT

## 2025-04-03 PROCEDURE — 86593 SYPHILIS TEST NON-TREP QUANT: CPT

## 2025-04-03 NOTE — PROGRESS NOTES
No chief complaint on file.      29 y.o.,  at 7w0d by US on 4/3/25    Patient presents today for initial prenatal visit.    Complaints today: .  Doing well overall.     ROS  OBSTETRICS:   Cramping No   Bleeding No    GASTRO:   Nausea No   Vomiting No      OB History    Para Term  AB Living   1        SAB IAB Ectopic Multiple Live Births             # Outcome Date GA Lbr Nate/2nd Weight Sex Type Anes PTL Lv   1 Current                Allergies and problem list reviewed and updated  Medical and surgical history reviewed    PHYSICAL EXAM  /74   Wt 102.6 kg (226 lb 3.1 oz)   LMP 2024 (Exact Date)   BMI 41.37 kg/m²     GENERAL: No acute distress  NEURO: Alert and oriented x3  PSYCH: Calm, normal mood and affect      ASSESSMENT AND PLAN    1. 7 weeks gestation of pregnancy  -     HIV 1/2 Ag/Ab (4th Gen); Future; Expected date: 2025  -     Treponema Pallidium Antibodies IgG, IgM; Future; Expected date: 2025  -     Type & Screen; Future; Expected date: 2025  -     Rubella antibody, IgG; Future; Expected date: 2025  -     Urine culture  -     CBC auto differential; Future; Expected date: 2025  -     Trichomonas vaginalis, RNA, Qual  -     Hepatitis Panel, Acute; Future; Expected date: 2025  -     C. trachomatis/N. gonorrhoeae by AMP DNA [facility]; Vagina  -     Hemoglobin Electrophoresis,Hgb A2 Victor Hugo.; Future; Expected date: 2025  -     Liquid-Based Pap Smear, Screening  -     Hemoglobin A1C; Future; Expected date: 2025    2. Recurrent major depressive disorder, in full remission  Overview:  Wellbutrin 100mg BID      3. ADHD (attention deficit hyperactivity disorder), combined type  Overview:  Concerta 36mg. Has been decreased to 27mg and will decrease to 18mg for remainder of pregnancy  MFM referral placed for consult on medication use    Orders:  -     Ambulatory referral/consult to Perinatology; Future; Expected date: 04/10/2025    4. Other  specified anxiety disorders  Overview:  Wellbutrin 100mg BID      5. BMI 40.0-44.9, adult    6. Insulin resistance  Overview:  A1C at RA   Early GTT at 16 weeks  Patient brother weighed 15 pounds at birth. Mom had GDM      Orders:  -     Hemoglobin A1C; Future; Expected date: 04/03/2025    7. Irregular menstrual cycle  Overview:  Very irregular cycles       8. Iron deficiency anemia secondary to inadequate dietary iron intake  Overview:  Iron daily      9. Primary insomnia  Overview:  Trazodone 50mg             Initial labs reviewed: pending  Dating u/s reviewed: SVIUP with cardiac activity, , CORBIN 11/20/25, EGA 7w0d  Urine culture  collected.  Counseled today on proper weight gain based on the [place] [employer]'s recommendations based on her pre-pregnancy weight. Reviewed potential risks to excessive weight gain.  - Discussed IOM recommended weight gain of:   Weight category Pre pre BMI  Recommended TWG   Underweight Less than 18.5 28-40    Normal Weight 18.5-24.9  25-35    Overweight 25-29.9  15-25    Obese   30 and greater  11-20      Pre-pregnancy BMI over 40 or excess pregnancy weight gain defined as:   Pre-preg BMI < 18.5; Excess weight gain = > 60 pound   Pre-preg BMI 18.5-24.9;  Excess weight gain = > 53 pounds   Pre-preg BMI 25-29.9;  Excess weight gain = > 38 pounds   Pre-preg BMI > 30;  Excess weight gain = > 30 pounds      Discussed prenatal vitamin options (i.e. stool softener, DHA).    Pregnancy course discussed and 'AtoZ' book given. Patient was counseled on proper weight gain based on the [place] [employer]'s recommendations based on her pre-pregnancy weight. Discussed foods to avoid in pregnancy (i.e. sushi, fish that are high in mercury, deli meat, and unpasteurized cheeses). Discussed prenatal vitamin options (i.e. stool softener, DHA). Discussed potential medical problems in pregnancy.  Oriented to practice including CNM collaboration.   Questions answered.    Reviewed aneuploidy  screening options and indications, questions answered - patient will check with insurance.  Education regarding warning signs.      Follow up: 4 wks, call or present sooner prn.

## 2025-04-04 LAB
ABSOLUTE EOSINOPHIL (OHS): 0.12 K/UL
ABSOLUTE MONOCYTE (OHS): 0.62 K/UL (ref 0.3–1)
ABSOLUTE NEUTROPHIL COUNT (OHS): 7.27 K/UL (ref 1.8–7.7)
BASOPHILS # BLD AUTO: 0.04 K/UL
BASOPHILS NFR BLD AUTO: 0.4 %
EAG (OHS): 100 MG/DL (ref 68–131)
ERYTHROCYTE [DISTWIDTH] IN BLOOD BY AUTOMATED COUNT: 13.3 % (ref 11.5–14.5)
HAV IGM SERPL QL IA: NORMAL
HBA1C MFR BLD: 5.1 % (ref 4–5.6)
HBV CORE IGM SERPL QL IA: NORMAL
HBV SURFACE AG SERPL QL IA: NORMAL
HCT VFR BLD AUTO: 40.5 % (ref 37–48.5)
HCV AB SERPL QL IA: NORMAL
HGB A2 MFR BLD HPLC: 2.4 % (ref 2.2–3.2)
HGB BLD-MCNC: 12.2 GM/DL (ref 12–16)
HGB FRACT BLD ELPH-IMP: NORMAL
HIV 1+2 AB+HIV1 P24 AG SERPL QL IA: NORMAL
IMM GRANULOCYTES # BLD AUTO: 0.03 K/UL (ref 0–0.04)
IMM GRANULOCYTES NFR BLD AUTO: 0.3 % (ref 0–0.5)
LYMPHOCYTES # BLD AUTO: 3.24 K/UL (ref 1–4.8)
MCH RBC QN AUTO: 26.1 PG (ref 27–31)
MCHC RBC AUTO-ENTMCNC: 30.1 G/DL (ref 32–36)
MCV RBC AUTO: 87 FL (ref 82–98)
NUCLEATED RBC (/100WBC) (OHS): 0 /100 WBC
PATHOLOGIST INTERPRETATION - HGB SERUM (OHS): NORMAL
PLATELET # BLD AUTO: 441 K/UL (ref 150–450)
PMV BLD AUTO: 9 FL (ref 9.2–12.9)
RBC # BLD AUTO: 4.68 M/UL (ref 4–5.4)
RELATIVE EOSINOPHIL (OHS): 1.1 %
RELATIVE LYMPHOCYTE (OHS): 28.6 % (ref 18–48)
RELATIVE MONOCYTE (OHS): 5.5 % (ref 4–15)
RELATIVE NEUTROPHIL (OHS): 64.1 % (ref 38–73)
RUBV IGG SER-ACNC: 16.2 IU/ML
RUBV IGG SER-IMP: REACTIVE
T PALLIDUM IGG+IGM SER QL: NORMAL
WBC # BLD AUTO: 11.32 K/UL (ref 3.9–12.7)

## 2025-04-05 LAB
BACTERIA UR CULT: NORMAL
SPECIMEN SOURCE: NORMAL
T VAGINALIS RRNA SPEC QL NAA+PROBE: NEGATIVE

## 2025-04-10 ENCOUNTER — TELEPHONE (OUTPATIENT)
Dept: OBSTETRICS AND GYNECOLOGY | Facility: CLINIC | Age: 30
End: 2025-04-10
Payer: MEDICAID

## 2025-04-10 NOTE — TELEPHONE ENCOUNTER
Perinatology referral noted. Consult only at this time, ok for virtual per referring CNM, message routed to [facility] MFM inbasket at this time for scheduling.

## 2025-04-11 ENCOUNTER — E-VISIT (OUTPATIENT)
Dept: OBSTETRICS AND GYNECOLOGY | Facility: CLINIC | Age: 30
End: 2025-04-11
Payer: MEDICAID

## 2025-04-11 ENCOUNTER — TELEPHONE (OUTPATIENT)
Dept: OBSTETRICS AND GYNECOLOGY | Facility: CLINIC | Age: 30
End: 2025-04-11

## 2025-04-11 DIAGNOSIS — O21.9 NAUSEA AND VOMITING DURING PREGNANCY: Primary | ICD-10-CM

## 2025-04-11 RX ORDER — PROMETHAZINE HYDROCHLORIDE 25 MG/1
25 TABLET ORAL EVERY 4 HOURS PRN
Qty: 60 TABLET | Refills: 2 | Status: SHIPPED | OUTPATIENT
Start: 2025-04-11

## 2025-04-11 NOTE — PROGRESS NOTES
[facility] Peq Evisit [facility]-Obgyn    4/11/2025  6:33 AM CDT - Filed by Patient   What do you need help with? Other Concern   Do you agree to participate in an E-Visit? Yes   If you have any of the following symptoms, please go to the nearest emergency room or call 911: I acknowledge   Do you have any of the following pregnancy-related conditions? Pregnant   Do you have any of the following symptoms? No   What is the main issue you would like addressed today? Severe nausea zofran not working   Please describe your symptoms. Persistant vomiting cant keep anything down   Where is your problem located? Digestive   On a scale of 1-10, where 10 is the worst you can imagine, how severe are your symptoms? (range: 1 - 10) 8   Have you had these symptoms before? Yes   How long have you had these symptoms? A few days   What helps with your symptoms? Mints   What makes your symptoms feel worse? Any food   Are your symptoms related to a condition you currently have? Yes   What condition do you currently have? Pregnancy   When were you last seen for this condition?    Provide any additional information you feel is important. Have a prescription for zofran and it is not working   Please attach any relevant images or files    Are you able to take your vital signs? No       Cander Tamiko,     Thank you for reaching out to me for an E-Visit so we can address your concern regarding: nausea and vomiting     I have reviewed your chart and read the answers to the questionnaire that you completed.  Based on the information provided, my diagnosis and recommendations are as follows:    Visit Diagnosis    1. Nausea and vomiting during pregnancy      Medications Ordered                [id number] [place] [facility] [address]   [address], [place] [address]    Telephone: [phone]   Fax: [phone]   Hours: Not open 24 hours                         E-Prescribed (1 of 1)               promethazine (PHENERGAN) 25 MG tablet    Sig: Take 1 tablet (25 mg total) by mouth every 4 (four) hours as needed for Nausea.       Start: 4/11/25     Quantity: 60 tablet Refills: 2                              Please let me know if there is something else that you need.  If you send additional messages concerning this illness, please use this message thread to communicate.      Romana Reza CNM

## 2025-04-11 NOTE — TELEPHONE ENCOUNTER
Called pt and le her know that I would speak to Mrs. Avendano about switching nausea medications. Pt verbalized understanding. Verified 2 pt identifiers.

## 2025-04-21 ENCOUNTER — RESULTS FOLLOW-UP (OUTPATIENT)
Dept: OBSTETRICS AND GYNECOLOGY | Facility: HOSPITAL | Age: 30
End: 2025-04-21

## 2025-04-22 ENCOUNTER — PATIENT MESSAGE (OUTPATIENT)
Dept: MATERNAL FETAL MEDICINE | Facility: CLINIC | Age: 30
End: 2025-04-22
Payer: MEDICAID

## 2025-05-08 PROBLEM — O09.891 MEDICATION EXPOSURE DURING FIRST TRIMESTER OF PREGNANCY: Status: ACTIVE | Noted: 2025-05-08

## 2025-05-13 ENCOUNTER — ROUTINE PRENATAL (OUTPATIENT)
Dept: OBSTETRICS AND GYNECOLOGY | Facility: CLINIC | Age: 30
End: 2025-05-13
Payer: MEDICAID

## 2025-05-13 VITALS — BODY MASS INDEX: 42.1 KG/M2 | WEIGHT: 230.19 LBS | SYSTOLIC BLOOD PRESSURE: 104 MMHG | DIASTOLIC BLOOD PRESSURE: 60 MMHG

## 2025-05-13 DIAGNOSIS — Z13.79 GENETIC SCREENING: ICD-10-CM

## 2025-05-13 DIAGNOSIS — Z86.69 HISTORY OF MIGRAINE: ICD-10-CM

## 2025-05-13 DIAGNOSIS — Z34.01 PRIMIGRAVIDA IN FIRST TRIMESTER: Primary | ICD-10-CM

## 2025-05-13 PROCEDURE — 99213 OFFICE O/P EST LOW 20 MIN: CPT | Mod: PBBFAC,TH

## 2025-05-13 PROCEDURE — 99999 PR PBB SHADOW E&M-EST. PATIENT-LVL III: CPT | Mod: PBBFAC,,,

## 2025-05-13 NOTE — PROGRESS NOTES
29 y.o. female  at 12w5d   denies VB or cramping  Doing well without concerns   First trimester s/s improving  /60   Wt 104.4 kg (230 lb 2.6 oz)   BMI 42.10 kg/m²    TWG: 3 lbs   Reviewed prenatal labs   Genetic testing ordered  Enrolled in connected moms    1. Primigravida in first trimester  -     Connected MOM Enrollment  -     Assign Connected MOM Program Consent Questionnaire    2. Genetic screening  -     [id number] Plus W/ Sex Chromosome Abnormalities* T21, T18, T13 & Y + X; Future; Expected date: 2025    3. History of migraine  Overview:  No meds             Reviewed warning signs, pregnancy precautions and how/when to call.  RTC x 4 wks, call or present sooner prn.

## 2025-06-05 ENCOUNTER — PATIENT MESSAGE (OUTPATIENT)
Dept: OTHER | Facility: OTHER | Age: 30
End: 2025-06-05
Payer: COMMERCIAL

## 2025-06-12 ENCOUNTER — E-VISIT (OUTPATIENT)
Dept: URGENT CARE | Facility: CLINIC | Age: 30
End: 2025-06-12
Payer: COMMERCIAL

## 2025-06-12 ENCOUNTER — PATIENT MESSAGE (OUTPATIENT)
Dept: OTHER | Facility: OTHER | Age: 30
End: 2025-06-12
Payer: COMMERCIAL

## 2025-06-12 DIAGNOSIS — U07.1 COVID-19: ICD-10-CM

## 2025-06-12 DIAGNOSIS — Z02.89 ENCOUNTER TO OBTAIN EXCUSE FROM WORK: Primary | ICD-10-CM

## 2025-07-03 ENCOUNTER — PATIENT MESSAGE (OUTPATIENT)
Dept: OTHER | Facility: OTHER | Age: 30
End: 2025-07-03
Payer: COMMERCIAL

## 2025-07-31 ENCOUNTER — PATIENT MESSAGE (OUTPATIENT)
Dept: OTHER | Facility: OTHER | Age: 30
End: 2025-07-31
Payer: COMMERCIAL

## 2025-08-06 ENCOUNTER — OFFICE VISIT (OUTPATIENT)
Dept: PRIMARY CARE CLINIC | Facility: CLINIC | Age: 30
End: 2025-08-06
Payer: COMMERCIAL

## 2025-08-06 VITALS — SYSTOLIC BLOOD PRESSURE: 120 MMHG | DIASTOLIC BLOOD PRESSURE: 85 MMHG

## 2025-08-06 DIAGNOSIS — Z3A.24 24 WEEKS GESTATION OF PREGNANCY: ICD-10-CM

## 2025-08-06 DIAGNOSIS — F39 MOOD DISORDER: ICD-10-CM

## 2025-08-06 DIAGNOSIS — F41.8 OTHER SPECIFIED ANXIETY DISORDERS: ICD-10-CM

## 2025-08-06 DIAGNOSIS — F33.42 RECURRENT MAJOR DEPRESSIVE DISORDER, IN FULL REMISSION: Primary | ICD-10-CM

## 2025-08-06 DIAGNOSIS — F90.2 ADHD (ATTENTION DEFICIT HYPERACTIVITY DISORDER), COMBINED TYPE: ICD-10-CM

## 2025-08-06 PROCEDURE — 3044F HG A1C LEVEL LT 7.0%: CPT | Mod: CPTII,95,, | Performed by: NURSE PRACTITIONER

## 2025-08-06 PROCEDURE — G2211 COMPLEX E/M VISIT ADD ON: HCPCS | Mod: 95,,, | Performed by: NURSE PRACTITIONER

## 2025-08-06 PROCEDURE — 3074F SYST BP LT 130 MM HG: CPT | Mod: CPTII,95,, | Performed by: NURSE PRACTITIONER

## 2025-08-06 PROCEDURE — 1160F RVW MEDS BY RX/DR IN RCRD: CPT | Mod: CPTII,95,, | Performed by: NURSE PRACTITIONER

## 2025-08-06 PROCEDURE — 3079F DIAST BP 80-89 MM HG: CPT | Mod: CPTII,95,, | Performed by: NURSE PRACTITIONER

## 2025-08-06 PROCEDURE — 1159F MED LIST DOCD IN RCRD: CPT | Mod: CPTII,95,, | Performed by: NURSE PRACTITIONER

## 2025-08-06 PROCEDURE — 98005 SYNCH AUDIO-VIDEO EST LOW 20: CPT | Mod: 95,,, | Performed by: NURSE PRACTITIONER

## 2025-08-06 RX ORDER — BUPROPION HYDROCHLORIDE 100 MG/1
TABLET, EXTENDED RELEASE ORAL
Qty: 60 TABLET | Refills: 11 | Status: SHIPPED | OUTPATIENT
Start: 2025-08-06

## 2025-08-06 RX ORDER — METHYLPHENIDATE HYDROCHLORIDE 18 MG/1
18 TABLET ORAL DAILY PRN
Qty: 30 TABLET | Refills: 0 | Status: SHIPPED | OUTPATIENT
Start: 2025-08-06 | End: 2025-08-06

## 2025-08-06 NOTE — PROGRESS NOTES
"Patient ID: Tamiko An is a 30 y.o. female.    Chief Complaint: No chief complaint on file.    History of Present Illness    Ms. An presents today to discuss restarting ADHD and depression medications during pregnancy    She is currently 6 days into the third trimester of pregnancy. She reports feeling very tired and experiencing significant pregnancy-related brain fog with difficulty focusing and completing tasks. She denies any pregnancy complications, reporting that the fetus is measuring perfectly with good amniotic fluid levels. She has not been diagnosed with pre-eclampsia or gestational hypertension.    She previously took Wellbutrin and Concerta prior to pregnancy. She currently experiences elevated anxiety and depression, which she attributes to hormonal changes. She describes severe focus issues related to ADHD with significant difficulty initiating and completing tasks. Her inability to focus is contributing to increased emotional distress, particularly in her new work position. She reports work-related stress due to numerous deadlines, which creates a "chiquita effect" that exacerbates her anxiety and depression. Symptoms are impacting her daily functioning and causing considerable psychological strain.    She is currently taking baby aspirin 81 mg daily as recommended.      ROS:  General: -fever, -chills, +fatigue, -weight gain, -weight loss  Eyes: -vision changes, -redness, -discharge  ENT: -ear pain, -nasal congestion, -sore throat  Cardiovascular: -chest pain, -palpitations, -lower extremity edema  Respiratory: -cough, -shortness of breath  Gastrointestinal: -abdominal pain, -nausea, -vomiting, -diarrhea, -constipation, -blood in stool  Genitourinary: -dysuria, -hematuria, -frequency, +excessive urination  Musculoskeletal: -joint pain, -muscle pain  Skin: -rash, -lesion  Neurological: -headache, -dizziness, -numbness, -tingling, +lack of focus/concentration, +confusion or disorientation, " +thought or thinking problems or concerns  Psychiatric: +anxiety, +depression, -sleep difficulty         Wt Readings from Last 10 Encounters:   05/13/25 104.4 kg (230 lb 2.6 oz)   04/03/25 102.6 kg (226 lb 3.1 oz)   11/21/24 103 kg (227 lb 2.9 oz)   10/31/24 106.4 kg (234 lb 10.9 oz)   10/23/24 106.4 kg (234 lb 10.9 oz)   06/25/24 104.9 kg (231 lb 2.4 oz)       The ASCVD Risk score (Eyal BARDALES, et al., 2019) failed to calculate for the following reasons:    The 2019 ASCVD risk score is only valid for ages 40 to 79    PAST MEDICAL HISTORY:  Past Medical History:   Diagnosis Date    ADHD     Back pain     Dr. Luo-chiropractor    Depression     Generalized anxiety disorder        PAST SURGICAL HISTORY:  Past Surgical History:   Procedure Laterality Date    APPENDECTOMY  2016       SOCIAL HISTORY:  Social History[1]    FAMILY HISTORY:  Family History   Problem Relation Name Age of Onset    Diabetes Mother      Arthritis Mother      Hypotension Mother      Diabetes Father      Arthritis Maternal Grandmother      Coronary artery disease Maternal Grandfather      Arthritis Maternal Grandfather         ALLERGIES AND MEDICATIONS: updated and reviewed.  Review of patient's allergies indicates:   Allergen Reactions    Vancomycin analogues Anaphylaxis    Latex, natural rubber Hives    Penicillin g Hives     Current Medications[2]      Physical Exam  Constitutional:       Appearance: Normal appearance.   HENT:      Head: Normocephalic and atraumatic.   Pulmonary:      Effort: Pulmonary effort is normal.   Neurological:      Mental Status: She is alert and oriented to person, place, and time.   Psychiatric:         Mood and Affect: Mood normal.          Assessment:   LABS:   Lab Results   Component Value Date    HGBA1C 5.1 04/03/2025    HGBA1C 5.4 06/25/2024      Lab Results   Component Value Date    CHOL 199 06/25/2024     Lab Results   Component Value Date    LDLCALC 143.6 06/25/2024     Lab Results   Component Value Date     WBC 11.32 04/03/2025    HGB 12.2 04/03/2025    HCT 40.5 04/03/2025     04/03/2025    CHOL 199 06/25/2024    TRIG 52 06/25/2024    HDL 45 06/25/2024    ALT 14 03/20/2025    AST 17 03/20/2025     03/20/2025    K 4.3 03/20/2025     03/20/2025    CREATININE 0.7 03/20/2025    BUN 13 03/20/2025    CO2 22 (L) 03/20/2025    TSH 0.869 06/25/2024    HGBA1C 5.1 04/03/2025       Plan:   Diagnoses and all orders for this visit:    Recurrent major depressive disorder, in full remission  -     buPROPion (WELLBUTRIN SR) 100 MG TBSR 12 hr tablet; TAKE ONE TABLET DAILY FOR SEVEN DAYS THEN INCREASE TO ONE TABLET TWICE A DAY THEREAFTER    Mood disorder  -     buPROPion (WELLBUTRIN SR) 100 MG TBSR 12 hr tablet; TAKE ONE TABLET DAILY FOR SEVEN DAYS THEN INCREASE TO ONE TABLET TWICE A DAY THEREAFTER    Other specified anxiety disorders  -     buPROPion (WELLBUTRIN SR) 100 MG TBSR 12 hr tablet; TAKE ONE TABLET DAILY FOR SEVEN DAYS THEN INCREASE TO ONE TABLET TWICE A DAY THEREAFTER    ADHD (attention deficit hyperactivity disorder), combined type  -     Discontinue: methylphenidate HCl 18 MG CR tablet; Take 1 tablet (18 mg total) by mouth daily as needed.    BMI 40.0-44.9, adult    24 weeks gestation of pregnancy        Assessment & Plan      IMPRESSION:  - Advised against starting Concerta given risk of elevated blood pressure during pregnancy.  - Resume Wellbutrin.  -Noted potential risks of stimulant medication during pregnancy, including dehydration, BP elevation, and increased urination.      DEPRESSION:  - Noted elevated depression, likely due to hormonal changes during pregnancy.    LONG TERM USE OF ASPIRIN:  - Documented that the patient is taking baby aspirin daily as part of current medication regimen.    PREGNANCY MONITORING:  - Educated on signs of gestational hypertension and importance of BP monitoring.  - Ms. An to monitor BP at home, especially if experiencing new onset headaches or fatigue, and  keep a log of readings.  - Informed about third trimester symptoms that may be exacerbated by stimulant medication.  - Ms. An to pay attention to swollen ankles.      FOLLOW-UP:  - Two week VV for Anxiety/Depression with ESTHER Kelley      During this audiovisual appt patient appears to be at home.    Each patient to whom he or she provides medical services by telemedicine is:  (1) informed of the relationship between the health care provider and patient and the respective role of any other health care provider with respect to management of the patient; and (2) notified that he or she may decline to receive medical services by telemedicine and may withdraw from such care at any time.    I spent a total of 21 minutes face to face and non-face to face on the date of this visit.This includes time preparing to see the patient (eg, review of tests, notes), obtaining and/or reviewing additional history from an independent historian and/or outside medical records, documenting clinical information in the electronic health record, independently interpreting results and/or communicating results to the patient/family/caregiver, or care coordinator.  Visit today included increased complexity associated with the care of the episodic problem addressed and managing the longitudinal care of the patient due to the serious and/or complex managed problem(s).      This note was generated with the assistance of ambient listening technology. Verbal consent was obtained by the patient and accompanying visitor(s) for the recording of patient appointment to facilitate this note. I attest to having reviewed and edited the generated note for accuracy, though some syntax or spelling errors may persist. Please contact the author of this note for any clarification.      Sincerely,  Warren Schulte NP   Answers submitted by the patient for this visit:  Review of Systems Questionnaire (Submitted on 8/6/2025)  activity change: No  unexpected weight  change: No  neck pain: No  hearing loss: No  rhinorrhea: No  trouble swallowing: No  eye discharge: No  visual disturbance: No  chest tightness: No  wheezing: No  chest pain: No  palpitations: No  blood in stool: No  constipation: No  vomiting: No  diarrhea: No  polydipsia: No  polyuria: No  difficulty urinating: No  hematuria: No  menstrual problem: No  dysuria: No  joint swelling: No  arthralgias: No  headaches: No  weakness: No  confusion: No  dysphoric mood: Yes         [1]   Social History  Socioeconomic History    Marital status: Single   Tobacco Use    Smoking status: Never     Passive exposure: Never    Smokeless tobacco: Never     Social Drivers of Health     Financial Resource Strain: Low Risk  (3/15/2025)    Overall Financial Resource Strain (CARDIA)     Difficulty of Paying Living Expenses: Not hard at all   Food Insecurity: No Food Insecurity (3/15/2025)    Hunger Vital Sign     Worried About Running Out of Food in the Last Year: Never true     Ran Out of Food in the Last Year: Never true   Transportation Needs: No Transportation Needs (3/15/2025)    PRAPARE - Transportation     Lack of Transportation (Medical): No     Lack of Transportation (Non-Medical): No   Physical Activity: Insufficiently Active (3/15/2025)    Exercise Vital Sign     Days of Exercise per Week: 2 days     Minutes of Exercise per Session: 30 min   Stress: Stress Concern Present (3/15/2025)    [employer] - Occupational Stress Questionnaire     Feeling of Stress : To some extent   Housing Stability: Low Risk  (3/15/2025)    Housing Stability Vital Sign     Unable to Pay for Housing in the Last Year: No     Number of Times Moved in the Last Year: 0     Homeless in the Last Year: No   [2]   Current Outpatient Medications   Medication Sig Dispense Refill    buPROPion (WELLBUTRIN SR) 100 MG TBSR 12 hr tablet TAKE ONE TABLET DAILY FOR SEVEN DAYS THEN INCREASE TO ONE TABLET TWICE A DAY THEREAFTER 60 tablet 11     ferrous sulfate (FEOSOL) Tab tablet Take 1 tablet by mouth daily with breakfast.      ondansetron (ZOFRAN-ODT) 8 MG TbDL Take 1 tablet (8 mg total) by mouth every 12 (twelve) hours as needed (nausea). 30 tablet 0    prenatal vit/iron fum/folic ac (PRENATAL 1+1 ORAL) Take by mouth.      promethazine (PHENERGAN) 25 MG tablet Take 1 tablet (25 mg total) by mouth every 4 (four) hours as needed for Nausea. 60 tablet 2     No current facility-administered medications for this visit.

## 2025-08-14 ENCOUNTER — PATIENT MESSAGE (OUTPATIENT)
Dept: OTHER | Facility: OTHER | Age: 30
End: 2025-08-14
Payer: COMMERCIAL

## 2025-08-28 ENCOUNTER — PATIENT MESSAGE (OUTPATIENT)
Dept: OTHER | Facility: OTHER | Age: 30
End: 2025-08-28
Payer: COMMERCIAL